# Patient Record
Sex: MALE | Race: BLACK OR AFRICAN AMERICAN | NOT HISPANIC OR LATINO | Employment: OTHER | URBAN - METROPOLITAN AREA
[De-identification: names, ages, dates, MRNs, and addresses within clinical notes are randomized per-mention and may not be internally consistent; named-entity substitution may affect disease eponyms.]

---

## 2017-01-25 ENCOUNTER — HISTORICAL (OUTPATIENT)
Dept: ADMINISTRATIVE | Facility: HOSPITAL | Age: 67
End: 2017-01-25

## 2017-05-11 LAB
COLOR STL: NORMAL
CONSISTENCY STL: NORMAL
HEMOCCULT SP1 STL QL: NEGATIVE

## 2017-05-14 LAB
COLOR STL: NORMAL
CONSISTENCY STL: NORMAL
HEMOCCULT SP2 STL QL: NEGATIVE

## 2017-05-15 LAB
COLOR STL: NORMAL
CONSISTENCY STL: NORMAL

## 2017-05-16 ENCOUNTER — HISTORICAL (OUTPATIENT)
Dept: ADMINISTRATIVE | Facility: HOSPITAL | Age: 67
End: 2017-05-16

## 2017-05-16 LAB
AMPHET UR QL SCN: NEGATIVE
BARBITURATE SCN PRESENT UR: NEGATIVE
BENZODIAZ UR QL SCN: NEGATIVE
CANNABINOIDS UR QL SCN: NEGATIVE
COCAINE UR QL SCN: NEGATIVE
ETHANOL SERPL-MCNC: <5 MG/DL
OPIATES UR QL SCN: NEGATIVE
PCP UR QL: NEGATIVE

## 2017-07-05 ENCOUNTER — HISTORICAL (OUTPATIENT)
Dept: ADMINISTRATIVE | Facility: HOSPITAL | Age: 67
End: 2017-07-05

## 2017-07-26 ENCOUNTER — HISTORICAL (OUTPATIENT)
Dept: ADMINISTRATIVE | Facility: HOSPITAL | Age: 67
End: 2017-07-26

## 2017-07-26 LAB
ABS NEUT (OLG): 2.26 X10(3)/MCL (ref 2.1–9.2)
ALBUMIN SERPL-MCNC: 3.4 GM/DL (ref 3.4–5)
ALBUMIN/GLOB SERPL: 1 RATIO (ref 1–2)
ALP SERPL-CCNC: 68 UNIT/L (ref 45–117)
ALT SERPL-CCNC: 7 UNIT/L (ref 12–78)
AMPHET UR QL SCN: NEGATIVE
AST SERPL-CCNC: 17 UNIT/L (ref 15–37)
BARBITURATE SCN PRESENT UR: NEGATIVE
BASOPHILS # BLD AUTO: 0.02 X10(3)/MCL
BASOPHILS NFR BLD AUTO: 0 % (ref 0–1)
BENZODIAZ UR QL SCN: NEGATIVE
BILIRUB SERPL-MCNC: 0.5 MG/DL (ref 0.2–1)
BILIRUBIN DIRECT+TOT PNL SERPL-MCNC: 0.1 MG/DL
BILIRUBIN DIRECT+TOT PNL SERPL-MCNC: 0.4 MG/DL
BUN SERPL-MCNC: 18 MG/DL (ref 7–18)
CALCIUM SERPL-MCNC: 9.8 MG/DL (ref 8.5–10.1)
CANNABINOIDS UR QL SCN: NEGATIVE
CHLORIDE SERPL-SCNC: 102 MMOL/L (ref 98–107)
CO2 SERPL-SCNC: 29 MMOL/L (ref 21–32)
COCAINE UR QL SCN: NEGATIVE
CREAT SERPL-MCNC: 1.4 MG/DL (ref 0.6–1.3)
EOSINOPHIL # BLD AUTO: 0.18 X10(3)/MCL
EOSINOPHIL NFR BLD AUTO: 3 % (ref 0–5)
ERYTHROCYTE [DISTWIDTH] IN BLOOD BY AUTOMATED COUNT: 13.9 % (ref 11.5–14.5)
ETHANOL SERPL-MCNC: 4 MG/DL
GLOBULIN SER-MCNC: 4.1 GM/ML (ref 2.3–3.5)
GLUCOSE SERPL-MCNC: 93 MG/DL (ref 74–106)
HCT VFR BLD AUTO: 38.5 % (ref 40–51)
HGB BLD-MCNC: 13.1 GM/DL (ref 13.5–17.5)
IMM GRANULOCYTES # BLD AUTO: 0.03 10*3/UL
IMM GRANULOCYTES NFR BLD AUTO: 0 %
LYMPHOCYTES # BLD AUTO: 3.22 X10(3)/MCL
LYMPHOCYTES NFR BLD AUTO: 50 % (ref 15–40)
MCH RBC QN AUTO: 32.3 PG (ref 26–34)
MCHC RBC AUTO-ENTMCNC: 34 GM/DL (ref 31–37)
MCV RBC AUTO: 95.1 FL (ref 80–100)
MONOCYTES # BLD AUTO: 0.68 X10(3)/MCL
MONOCYTES NFR BLD AUTO: 11 % (ref 4–12)
NEUTROPHILS # BLD AUTO: 2.26 X10(3)/MCL
NEUTROPHILS NFR BLD AUTO: 35 X10(3)/MCL
OPIATES UR QL SCN: NEGATIVE
PCP UR QL: NEGATIVE
PH UR STRIP.AUTO: 5.5 [PH] (ref 5–8)
PLATELET # BLD AUTO: 190 X10(3)/MCL (ref 130–400)
PMV BLD AUTO: 11.5 FL (ref 7.4–10.4)
POTASSIUM SERPL-SCNC: 4.6 MMOL/L (ref 3.5–5.1)
PROT SERPL-MCNC: 7.5 GM/DL (ref 6.4–8.2)
RBC # BLD AUTO: 4.05 X10(6)/MCL (ref 4.5–5.9)
SODIUM SERPL-SCNC: 138 MMOL/L (ref 136–145)
TEMPERATURE, URINE (OHS): 24 DEGC (ref 20–25)
WBC # SPEC AUTO: 6.4 X10(3)/MCL (ref 4.5–11)

## 2017-08-10 ENCOUNTER — HISTORICAL (OUTPATIENT)
Dept: ADMINISTRATIVE | Facility: HOSPITAL | Age: 67
End: 2017-08-10

## 2017-08-10 LAB
BUN SERPL-MCNC: 13 MG/DL (ref 7–18)
CALCIUM SERPL-MCNC: 9.6 MG/DL (ref 8.5–10.1)
CHLORIDE SERPL-SCNC: 104 MMOL/L (ref 98–107)
CO2 SERPL-SCNC: 25 MMOL/L (ref 21–32)
CREAT SERPL-MCNC: 1.3 MG/DL (ref 0.6–1.3)
GLUCOSE SERPL-MCNC: 91 MG/DL (ref 74–106)
POTASSIUM SERPL-SCNC: 4.1 MMOL/L (ref 3.5–5.1)
SODIUM SERPL-SCNC: 138 MMOL/L (ref 136–145)

## 2017-08-21 ENCOUNTER — HISTORICAL (OUTPATIENT)
Dept: ADMINISTRATIVE | Facility: HOSPITAL | Age: 67
End: 2017-08-21

## 2017-08-21 LAB
ABS NEUT (OLG): 2.19 X10(3)/MCL (ref 2.1–9.2)
ALBUMIN SERPL-MCNC: 3.5 GM/DL (ref 3.4–5)
ALBUMIN/GLOB SERPL: 1 RATIO (ref 1–2)
ALP SERPL-CCNC: 82 UNIT/L (ref 45–117)
ALT SERPL-CCNC: 9 UNIT/L (ref 12–78)
AST SERPL-CCNC: 19 UNIT/L (ref 15–37)
BASOPHILS # BLD AUTO: 0.03 X10(3)/MCL
BASOPHILS NFR BLD AUTO: 0 % (ref 0–1)
BILIRUB SERPL-MCNC: 0.6 MG/DL (ref 0.2–1)
BILIRUBIN DIRECT+TOT PNL SERPL-MCNC: 0.2 MG/DL
BILIRUBIN DIRECT+TOT PNL SERPL-MCNC: 0.4 MG/DL
BUN SERPL-MCNC: 12 MG/DL (ref 7–18)
CALCIUM SERPL-MCNC: 9.4 MG/DL (ref 8.5–10.1)
CHLORIDE SERPL-SCNC: 106 MMOL/L (ref 98–107)
CO2 SERPL-SCNC: 28 MMOL/L (ref 21–32)
CREAT SERPL-MCNC: 1.4 MG/DL (ref 0.6–1.3)
EOSINOPHIL # BLD AUTO: 0.19 X10(3)/MCL
EOSINOPHIL NFR BLD AUTO: 3 % (ref 0–5)
ERYTHROCYTE [DISTWIDTH] IN BLOOD BY AUTOMATED COUNT: 13.7 % (ref 11.5–14.5)
GLOBULIN SER-MCNC: 4.1 GM/ML (ref 2.3–3.5)
GLUCOSE SERPL-MCNC: 103 MG/DL (ref 74–106)
HCT VFR BLD AUTO: 39.7 % (ref 40–51)
HGB BLD-MCNC: 13.5 GM/DL (ref 13.5–17.5)
IMM GRANULOCYTES # BLD AUTO: 0.01 10*3/UL
IMM GRANULOCYTES NFR BLD AUTO: 0 %
LYMPHOCYTES # BLD AUTO: 2.49 X10(3)/MCL
LYMPHOCYTES NFR BLD AUTO: 44 % (ref 15–40)
MCH RBC QN AUTO: 32.5 PG (ref 26–34)
MCHC RBC AUTO-ENTMCNC: 34 GM/DL (ref 31–37)
MCV RBC AUTO: 95.4 FL (ref 80–100)
MONOCYTES # BLD AUTO: 0.75 X10(3)/MCL
MONOCYTES NFR BLD AUTO: 13 % (ref 4–12)
NEUTROPHILS # BLD AUTO: 2.19 X10(3)/MCL
NEUTROPHILS NFR BLD AUTO: 39 X10(3)/MCL
PLATELET # BLD AUTO: 211 X10(3)/MCL (ref 130–400)
PMV BLD AUTO: 10.7 FL (ref 7.4–10.4)
POTASSIUM SERPL-SCNC: 4.1 MMOL/L (ref 3.5–5.1)
PROT SERPL-MCNC: 7.6 GM/DL (ref 6.4–8.2)
RBC # BLD AUTO: 4.16 X10(6)/MCL (ref 4.5–5.9)
SODIUM SERPL-SCNC: 141 MMOL/L (ref 136–145)
WBC # SPEC AUTO: 5.7 X10(3)/MCL (ref 4.5–11)

## 2017-09-19 ENCOUNTER — HISTORICAL (OUTPATIENT)
Dept: ADMINISTRATIVE | Facility: HOSPITAL | Age: 67
End: 2017-09-19

## 2017-09-19 LAB
ABS NEUT (OLG): 3.58 X10(3)/MCL (ref 2.1–9.2)
ALBUMIN SERPL-MCNC: 3.2 GM/DL (ref 3.4–5)
ALBUMIN/GLOB SERPL: 1 RATIO (ref 1–2)
ALP SERPL-CCNC: 83 UNIT/L (ref 45–117)
ALT SERPL-CCNC: 7 UNIT/L (ref 12–78)
AST SERPL-CCNC: 19 UNIT/L (ref 15–37)
BASOPHILS # BLD AUTO: 0.02 X10(3)/MCL
BASOPHILS NFR BLD AUTO: 0 % (ref 0–1)
BILIRUB SERPL-MCNC: 0.3 MG/DL (ref 0.2–1)
BILIRUBIN DIRECT+TOT PNL SERPL-MCNC: 0.1 MG/DL
BILIRUBIN DIRECT+TOT PNL SERPL-MCNC: 0.2 MG/DL
BUN SERPL-MCNC: 12 MG/DL (ref 7–18)
CALCIUM SERPL-MCNC: 9 MG/DL (ref 8.5–10.1)
CHLORIDE SERPL-SCNC: 104 MMOL/L (ref 98–107)
CO2 SERPL-SCNC: 29 MMOL/L (ref 21–32)
CREAT SERPL-MCNC: 1.3 MG/DL (ref 0.6–1.3)
EOSINOPHIL # BLD AUTO: 0.18 X10(3)/MCL
EOSINOPHIL NFR BLD AUTO: 2 % (ref 0–5)
ERYTHROCYTE [DISTWIDTH] IN BLOOD BY AUTOMATED COUNT: 13.1 % (ref 11.5–14.5)
GLOBULIN SER-MCNC: 4.2 GM/ML (ref 2.3–3.5)
GLUCOSE SERPL-MCNC: 93 MG/DL (ref 74–106)
HCT VFR BLD AUTO: 37.8 % (ref 40–51)
HGB BLD-MCNC: 13 GM/DL (ref 13.5–17.5)
IMM GRANULOCYTES # BLD AUTO: 0.02 10*3/UL
IMM GRANULOCYTES NFR BLD AUTO: 0 %
LYMPHOCYTES # BLD AUTO: 2.75 X10(3)/MCL
LYMPHOCYTES NFR BLD AUTO: 38 % (ref 15–40)
MCH RBC QN AUTO: 32.5 PG (ref 26–34)
MCHC RBC AUTO-ENTMCNC: 34.4 GM/DL (ref 31–37)
MCV RBC AUTO: 94.5 FL (ref 80–100)
MONOCYTES # BLD AUTO: 0.76 X10(3)/MCL
MONOCYTES NFR BLD AUTO: 10 % (ref 4–12)
NEUTROPHILS # BLD AUTO: 3.58 X10(3)/MCL
NEUTROPHILS NFR BLD AUTO: 49 X10(3)/MCL
PLATELET # BLD AUTO: 234 X10(3)/MCL (ref 130–400)
PMV BLD AUTO: 10.3 FL (ref 7.4–10.4)
POTASSIUM SERPL-SCNC: 4 MMOL/L (ref 3.5–5.1)
PROT SERPL-MCNC: 7.4 GM/DL (ref 6.4–8.2)
RBC # BLD AUTO: 4 X10(6)/MCL (ref 4.5–5.9)
SODIUM SERPL-SCNC: 137 MMOL/L (ref 136–145)
WBC # SPEC AUTO: 7.3 X10(3)/MCL (ref 4.5–11)

## 2017-12-19 ENCOUNTER — HISTORICAL (OUTPATIENT)
Dept: INTERNAL MEDICINE | Facility: CLINIC | Age: 67
End: 2017-12-19

## 2018-02-16 ENCOUNTER — HISTORICAL (OUTPATIENT)
Dept: ADMINISTRATIVE | Facility: HOSPITAL | Age: 68
End: 2018-02-16

## 2019-01-08 ENCOUNTER — HISTORICAL (OUTPATIENT)
Dept: INTERNAL MEDICINE | Facility: CLINIC | Age: 69
End: 2019-01-08

## 2019-04-09 ENCOUNTER — HISTORICAL (OUTPATIENT)
Dept: LAB | Facility: HOSPITAL | Age: 69
End: 2019-04-09

## 2019-04-10 LAB — GRAM STN SPEC: NORMAL

## 2019-04-12 LAB — FINAL CULTURE: NORMAL

## 2019-05-14 LAB — FINAL CULTURE: NORMAL

## 2019-08-20 ENCOUNTER — HISTORICAL (OUTPATIENT)
Dept: INTERNAL MEDICINE | Facility: CLINIC | Age: 69
End: 2019-08-20

## 2019-09-18 ENCOUNTER — HISTORICAL (OUTPATIENT)
Dept: RADIOLOGY | Facility: HOSPITAL | Age: 69
End: 2019-09-18

## 2019-10-28 ENCOUNTER — HISTORICAL (OUTPATIENT)
Dept: INTERNAL MEDICINE | Facility: CLINIC | Age: 69
End: 2019-10-28

## 2020-04-29 ENCOUNTER — HISTORICAL (OUTPATIENT)
Dept: RADIOLOGY | Facility: HOSPITAL | Age: 70
End: 2020-04-29

## 2020-08-20 ENCOUNTER — HISTORICAL (OUTPATIENT)
Dept: INTERNAL MEDICINE | Facility: CLINIC | Age: 70
End: 2020-08-20

## 2021-03-09 ENCOUNTER — HISTORICAL (OUTPATIENT)
Dept: INTERNAL MEDICINE | Facility: CLINIC | Age: 71
End: 2021-03-09

## 2021-07-02 LAB — NONINV COLON CA DNA+OCC BLD SCRN STL QL: NEGATIVE

## 2021-09-28 ENCOUNTER — HISTORICAL (OUTPATIENT)
Dept: INTERNAL MEDICINE | Facility: CLINIC | Age: 71
End: 2021-09-28

## 2022-03-09 ENCOUNTER — HISTORICAL (OUTPATIENT)
Dept: INTERNAL MEDICINE | Facility: CLINIC | Age: 72
End: 2022-03-09

## 2022-03-09 LAB
ABS NEUT (OLG): 3.22 (ref 2.1–9.2)
ALBUMIN SERPL-MCNC: 3.7 G/DL (ref 3.4–4.8)
ALBUMIN/GLOB SERPL: 0.9 {RATIO} (ref 1.1–2)
ALP SERPL-CCNC: 91 U/L (ref 40–150)
ALT SERPL-CCNC: 10 U/L (ref 0–55)
APPEARANCE, UA: CLEAR
AST SERPL-CCNC: 21 U/L (ref 5–34)
BACTERIA SPEC CULT: NORMAL
BASOPHILS # BLD AUTO: 0 10*3/UL (ref 0–0.2)
BASOPHILS NFR BLD AUTO: 0 %
BILIRUB SERPL-MCNC: 0.6 MG/DL
BILIRUB UR QL STRIP: NEGATIVE
BILIRUBIN DIRECT+TOT PNL SERPL-MCNC: 0.2 (ref 0–0.5)
BILIRUBIN DIRECT+TOT PNL SERPL-MCNC: 0.4 (ref 0–0.8)
BUN SERPL-MCNC: 9.7 MG/DL (ref 8.4–25.7)
CALCIUM SERPL-MCNC: 9.8 MG/DL (ref 8.7–10.5)
CHLORIDE SERPL-SCNC: 106 MMOL/L (ref 98–107)
CHOLEST SERPL-MCNC: 214 MG/DL
CHOLEST/HDLC SERPL: 4 {RATIO} (ref 0–5)
CO2 SERPL-SCNC: 23 MMOL/L (ref 23–31)
COLOR UR: NORMAL
CREAT SERPL-MCNC: 1.14 MG/DL (ref 0.73–1.18)
EOSINOPHIL # BLD AUTO: 0.2 10*3/UL (ref 0–0.9)
EOSINOPHIL NFR BLD AUTO: 3 %
ERYTHROCYTE [DISTWIDTH] IN BLOOD BY AUTOMATED COUNT: 14.8 % (ref 11.5–14.5)
EST. AVERAGE GLUCOSE BLD GHB EST-MCNC: 99.7 MG/DL
FLAG2 (OHS): 80
FLAG3 (OHS): 80
FLAGS (OHS): 70
GLOBULIN SER-MCNC: 4 G/DL (ref 2.4–3.5)
GLUCOSE (UA): NORMAL
GLUCOSE SERPL-MCNC: 110 MG/DL (ref 82–115)
HBA1C MFR BLD: 5.1 %
HCT VFR BLD AUTO: 42.3 % (ref 40–51)
HDLC SERPL-MCNC: 53 MG/DL (ref 35–60)
HEMOLYSIS INTERF INDEX SERPL-ACNC: 6
HGB BLD-MCNC: 14 G/DL (ref 13.5–17.5)
HGB UR QL STRIP: NEGATIVE
HYALINE CASTS #/AREA URNS LPF: NORMAL /[LPF]
ICTERIC INTERF INDEX SERPL-ACNC: 1
IMM GRANULOCYTES # BLD AUTO: 0.02 10*3/UL
IMM GRANULOCYTES NFR BLD AUTO: 0 %
IMM. NE 2 SUSPECT FLAG (OHS): 10
KETONES UR QL STRIP: NEGATIVE
LDLC SERPL CALC-MCNC: 142 MG/DL (ref 50–140)
LEUKOCYTE ESTERASE UR QL STRIP: NEGATIVE
LIPEMIC INTERF INDEX SERPL-ACNC: 6
LOW EVENT # SUSPECT FLAG (OHS): 70
LYMPHOCYTES # BLD AUTO: 2.9 10*3/UL (ref 0.6–4.6)
LYMPHOCYTES NFR BLD AUTO: 42 %
MANUAL DIFF? (OHS): NO
MCH RBC QN AUTO: 32 PG (ref 26–34)
MCHC RBC AUTO-ENTMCNC: 33.1 G/DL (ref 31–37)
MCV RBC AUTO: 96.8 FL (ref 80–100)
MO BLASTS SUSPECT FLAG (OHS): 70
MONOCYTES # BLD AUTO: 0.6 10*3/UL (ref 0.1–1.3)
MONOCYTES NFR BLD AUTO: 8 %
MUCOUS THREADS URNS QL MICRO: NORMAL
NEUTROPHILS # BLD AUTO: 3.22 10*3/UL (ref 2.1–9.2)
NEUTROPHILS NFR BLD AUTO: 46 %
NITRITE UR QL STRIP: NEGATIVE
NRBC BLD AUTO-RTO: 0 % (ref 0–0.2)
PH UR STRIP: 6 [PH] (ref 4.5–8)
PLATELET # BLD AUTO: 204 10*3/UL (ref 130–400)
PLATELET CLUMPS SUSPECT FLAG (OHS): 30
PMV BLD AUTO: 11.7 FL (ref 7.4–10.4)
POTASSIUM SERPL-SCNC: 3.9 MMOL/L (ref 3.5–5.1)
PROT SERPL-MCNC: 7.7 G/DL (ref 5.8–7.6)
PROT UR QL STRIP: NEGATIVE
PSA SERPL-MCNC: 0.24 NG/ML
RBC # BLD AUTO: 4.37 10*6/UL (ref 4.5–5.9)
RBC #/AREA URNS HPF: NORMAL /[HPF] (ref 0–5)
SODIUM SERPL-SCNC: 136 MMOL/L (ref 136–145)
SP GR UR STRIP: 1.01 (ref 1–1.03)
SQUAMOUS EPITHELIAL, UA: NORMAL
TRIGL SERPL-MCNC: 95 MG/DL (ref 34–140)
TSH SERPL-ACNC: 1.82 M[IU]/L (ref 0.35–4.94)
UROBILINOGEN UR STRIP-ACNC: NORMAL
VLDLC SERPL CALC-MCNC: 19 MG/DL
WBC # SPEC AUTO: 7 10*3/UL (ref 4.5–11)
WBC #/AREA URNS HPF: NORMAL /[HPF] (ref 0–5)

## 2022-04-09 ENCOUNTER — HISTORICAL (OUTPATIENT)
Dept: ADMINISTRATIVE | Facility: HOSPITAL | Age: 72
End: 2022-04-09

## 2022-04-25 VITALS
HEIGHT: 68 IN | BODY MASS INDEX: 25.46 KG/M2 | SYSTOLIC BLOOD PRESSURE: 138 MMHG | DIASTOLIC BLOOD PRESSURE: 78 MMHG | WEIGHT: 168 LBS

## 2022-04-30 NOTE — PROGRESS NOTES
Patient:   Jose De Jesus Zaidi             MRN: [id number]            FIN: [id number]               Age:   68 years     Sex:  Male     :  1950   Associated Diagnoses:   None   Author:   Lubna MOHR, Doretha Gleason reviewed: Will discuss with patient during follow up appointment on  2019 at 11:10am.

## 2022-05-02 ENCOUNTER — TELEPHONE (OUTPATIENT)
Dept: INTERNAL MEDICINE | Facility: CLINIC | Age: 72
End: 2022-05-02

## 2022-05-02 NOTE — TELEPHONE ENCOUNTER
Pt called stating that you guys discussed a shot he was suppose to receive but he can not recall the name of the shot. Pt is also asking to speak to you, a good call back number is [phone].

## 2022-05-02 NOTE — TELEPHONE ENCOUNTER
We did not discuss shots during my encounter. Perhaps, he spoke to Eva about vaccines and that's where the conversation started. However, he didn't discuss any concerns with me. Otherwise, we will revisit this at his next visit.       Thanks,   REGIS Peña

## 2022-05-02 NOTE — TELEPHONE ENCOUNTER
Called pt to get more information. Pt states it is the Shingles vaccine you guys were talking about but looked in his chart but he has taken both doses in 2021. He last took his Pneumococcal vaccine on 10- so maybe that was the vaccine you guys discussed

## 2022-05-02 NOTE — TELEPHONE ENCOUNTER
Okay would you like for me to call the patient back and find out who he spoke to, because he did not want to give me info just kept saying he wanted you to call him.

## 2022-05-02 NOTE — HISTORICAL [facility]ER
This is a historical note converted from Kirsten. Formatting and pictures may have been removed.  Please reference Cerbarbara for original formatting and attached multimedia. Chief Complaint  HEP C F/U  History of Present Illness  66 yo BM presents for HCV f/u visit.? Reports 100% adherent to marin Black well. Drinking apprx 3-4 16 oz bottles of water per day.? No c/o today.  ?  2/9/17  67 yo BM presents for initial visit HCV, dx 12/16 in internal medicine clinic.? 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 Clean x > 1 yr now.? Occasional etoh use, but can easily quit completely.? Home tattoos at 16 yr old.? No known HCV + sexual contacts, no blood transfusions.? Denies jaundice, dark urine, or marilee stools.? No precipitous weight loss. Missed U/S appt, r/sd for 3/6/17. [1]  Review of Systems  ?  ?  Constitutional: negative except as stated in HPI  Eye: negative except as stated in HPI  ENMT: negative except as stated in HPI  Respiratory: negative except as stated in HPI  Cardiovascular: negative except as stated in HPI  Gastrointestinal: negative except as stated in HPI  Genitourinary: negative except as stated in HPI  Hema/Lymph: negative except as stated in HPI  Endocrine: negative except as stated in HPI  Immunologic: negative except as stated in HPI  Musculoskeletal: negative except as stated in HPI  Integumentary: negative except as stated in HPI  Neurologic: negative except as stated in HPI  ?   All Other ROS_ ?negative except as stated in HPI  Physical Exam  Vitals & Measurements  T:?36.6? ?C ?(Oral)? HR:?67?(Peripheral)? BP:?148/83? HT:?172?cm? HT:?172?cm? WT:?74.38?kg? WT:?74.38?kg? BMI:?25.14?  General: AAO X 4, afebrile  Eye: no icterus  Respiratory: BBS CTA, non-labored, symmetrical  Cardiovascular: S1S2, RRR  Gastrointestinal BS + 4 quadrants, NTND, soft  Musculoskeletal: MAEW, steady gait  Integumentary: WDI  Neurologic: CN II-XII intact  Assessment/Plan  1.?Hepatitis  C  ??GT 1a, treatment naive (dx 12/16), F4, NS5A RAVs detected  cont to abstain from all illicits & etoh  blood/sex precautions  cont Harvoni 1 po daily, week 6/12  drink plenty of water, repeat BMP today  Hep B vax #2 today, #3 11/2017  rtc 6 wks w rodney  Ordered:  hepatitis B adult vaccine, 1 mL, 20 mcg =, form: Injection, IM, Once-Unscheduled, first dose 08/10/17 11:52:00 CDT  Basic Metabolic Panel, Routine collect, *Est. 08/10/17 3:00:00 CDT, Blood, Order for future visit, *Est. Stop date 08/10/17 3:00:00 CDT, Lab Collect, Hepatitis C, On Exactly, 08/10/17 11:52:00 CDT  Clinic Follow up, *Est. 09/21/17 3:00:00 CDT, Order for future visit, Hepatitis C, [facility]  Office/Outpatient Visit Level 4 Established 09261 , Hepatitis C, [facility] C, 08/10/17 11:52:00 CDT  ?   Problem List/Past Medical History  HTN - Hypertension  Tobacco user  Tobacco user  Tobacco user  Historical  No historical problems  Procedure/Surgical History  LEFT FOOT SURGERY (2016), BACK SURGERY (1996), Cataract surgery.  Medications  DICLOFENAC 0.1% EYE DROPS  DORZOLAMIDE-TIMOLOL EYE DROPS  Harvoni 90 mg-400 mg oral tablet, 1 tab(s), Oral, Daily, 2 refills  hepatitis B adult vaccine, 20 mcg, 1 mL, IM, Once-Unscheduled  hydrochlorothiazide-lisinopril 12.5 mg-20 mg oral tablet, 1 tab(s), Oral, Daily, 2 refills  PREDNISOLONE AC 1% EYE DROP  Allergies  No Known Medication Allergies  Social History  Alcohol  Past, Beer, Liquor, 1-2 times per month, Alcohol use interferes with work or home: No. Drinks more than intended: No. Others hurt by drinking: No. Ready to change: No.  Employment/School  Retired, disabled  Home/Environment  Lives with Children. Living situation: Home/Independent. Alcohol abuse in household: No. Substance abuse in household: No. Smoker in household: No. Feels unsafe at home: No. Safe place to go: Yes. Family/Friends available for support: Yes. Concern for family members at home: No. Major illness in household:  No.  Nutrition/Health  Regular, Caffeine intake amount: COFFEE SODA TEA. Wants to lose weight: No. Sleeping concerns: No. Feels highly stressed: No.  Substance Abuse  Never  Tobacco  Current some day smoker, Cigarettes, Previous treatment: None.  Current every day smoker, Cigarettes, 10 per day. Started age 15.0 Years. Ready to change: Yes.  Family History  Asthma.: Mother.  : Mother, Father and Brother.  Heart disease: Father.  Lab Results  Creatinine 1.40 mg/dL 2017 11:36 CDT (High)  eGFR-AA 65 mL/min 2017 11:36 CDT (Low)  AST 17 unit/L 2017 11:36 CDT  ALT 7 unit/L 2017 11:36 CDT (Low)  Platelet 190 x10(3)/mcL 2017 11:36 CDT  Ethanol Lvl 4.0 mg/dL 2017 11:36 CDT  U Amph Scr NEG 2017 11:35 CDT  U Keyonna Scr NEG 2017 11:35 CDT  U Benzodia Scr NEG 2017 11:35 CDT  U Cannab Scr NEG 2017 11:35 CDT  U Cocaine Scr NEG 2017 11:35 CDT  U Opiate Scr NEG 2017 11:35 CDT  U Phencyclidine Scr NEG 2017 11:35 CDT  HCV Not Detected?  Result type:???????  Hep C Qnt-LC  Result date:???????  2017 11:36 CDT  [3]  Diagnostic Results  ?  Reason For Exam  Unspecified viral hepatitis C without hepatic coma;Hepatitis  ?   Radiology Report  ?  EXAM: Ultrasound abdomen, limited  ?  INDICATION: Hepatitis C  ?  TECHNIQUE: Transverse and longitudinal ultrasonographic imaging of the  right upper quadrant of the abdomen is obtained. Select images are  submitted for review.  ?  COMPARISON: None?  ?  FINDINGS: The visualized segments of the inferior vena cava and  abdominal aorta are within normal limits. The pancreas is normal in  echogenicity as seen, with the tail partially obscured by overlying  bowel gas. The liver is normal in size and contour without focal  lesion, however is mildly and diffusely heterogeneous. Normal  hepatopedal flow is maintained. The gallbladder is nondistended  without wall thickening. The common bile duct is within normal  limits,  measuring 5.4 mm in diameter.?  ?  The right kidney measures 10.7 x 5.6 x 4.0 cm. The right kidney is  normal in echogenicity without hydronephrosis or suggestion of renal  calculus.  ?  ?  IMPRESSION:?  1. Mild diffuse heterogeneity of the liver without focal lesion.  2. Otherwise normal ultrasound evaluation of the right upper quadrant  of the abdomen.  ?  ?   Signature Line  Electronically Signed By: Belén Garcia DO  Date/Time Signed: 07/05/2017 10:46  [4]     [1]?Office Visit Note; Loree Collier 05/18/2017 11:13 CDT  [2]?Office Visit Note; Loree Collier 05/18/2017 11:13 CDT  [3]? ; Contributor_system, [facility] 07/26/2017 11:36 CDT  [4]?US Abdomen Limited; Dwayne Urbano 07/05/2017 08:59 CDT

## 2022-06-13 ENCOUNTER — PATIENT OUTREACH (OUTPATIENT)
Dept: ADMINISTRATIVE | Facility: HOSPITAL | Age: 72
End: 2022-06-13
Payer: COMMERCIAL

## 2022-06-13 NOTE — PROGRESS NOTES
Population Health Outreach.Records Received, hyper-linked into chart at this time. The following record(s)  below were uploaded for Health Maintenance .             10/7/2021 MARCO

## 2022-10-27 ENCOUNTER — OFFICE VISIT (OUTPATIENT)
Dept: INTERNAL MEDICINE | Facility: CLINIC | Age: 72
End: 2022-10-27
Payer: COMMERCIAL

## 2022-10-27 VITALS
BODY MASS INDEX: 26.77 KG/M2 | WEIGHT: 176.63 LBS | DIASTOLIC BLOOD PRESSURE: 74 MMHG | TEMPERATURE: 98 F | HEART RATE: 66 BPM | HEIGHT: 68 IN | SYSTOLIC BLOOD PRESSURE: 134 MMHG | RESPIRATION RATE: 20 BRPM

## 2022-10-27 DIAGNOSIS — Z23 NEED FOR VACCINATION: Primary | ICD-10-CM

## 2022-10-27 DIAGNOSIS — Z72.0 TOBACCO ABUSE: ICD-10-CM

## 2022-10-27 DIAGNOSIS — N40.0 BENIGN PROSTATIC HYPERPLASIA, UNSPECIFIED WHETHER LOWER URINARY TRACT SYMPTOMS PRESENT: ICD-10-CM

## 2022-10-27 DIAGNOSIS — Z86.19 HISTORY OF HEPATITIS C: ICD-10-CM

## 2022-10-27 DIAGNOSIS — Z00.00 WELLNESS EXAMINATION: ICD-10-CM

## 2022-10-27 DIAGNOSIS — N18.2 CKD (CHRONIC KIDNEY DISEASE) STAGE 2, GFR 60-89 ML/MIN: ICD-10-CM

## 2022-10-27 DIAGNOSIS — Z12.11 SCREENING FOR COLON CANCER: ICD-10-CM

## 2022-10-27 DIAGNOSIS — I10 HYPERTENSION, UNSPECIFIED TYPE: ICD-10-CM

## 2022-10-27 DIAGNOSIS — R39.9 LOWER URINARY TRACT SYMPTOMS (LUTS): ICD-10-CM

## 2022-10-27 PROCEDURE — 3288F FALL RISK ASSESSMENT DOCD: CPT | Mod: CPTII,,, | Performed by: NURSE PRACTITIONER

## 2022-10-27 PROCEDURE — 90677 PCV20 VACCINE IM: CPT | Mod: PBBFAC

## 2022-10-27 PROCEDURE — 4010F ACE/ARB THERAPY RXD/TAKEN: CPT | Mod: CPTII,,, | Performed by: NURSE PRACTITIONER

## 2022-10-27 PROCEDURE — 3288F PR FALLS RISK ASSESSMENT DOCUMENTED: ICD-10-PCS | Mod: CPTII,,, | Performed by: NURSE PRACTITIONER

## 2022-10-27 PROCEDURE — 1160F PR REVIEW ALL MEDS BY PRESCRIBER/CLIN PHARMACIST DOCUMENTED: ICD-10-PCS | Mod: CPTII,,, | Performed by: NURSE PRACTITIONER

## 2022-10-27 PROCEDURE — 1101F PT FALLS ASSESS-DOCD LE1/YR: CPT | Mod: CPTII,,, | Performed by: NURSE PRACTITIONER

## 2022-10-27 PROCEDURE — 99214 OFFICE O/P EST MOD 30 MIN: CPT | Mod: S$PBB,,, | Performed by: NURSE PRACTITIONER

## 2022-10-27 PROCEDURE — 1126F PR PAIN SEVERITY QUANTIFIED, NO PAIN PRESENT: ICD-10-PCS | Mod: CPTII,,, | Performed by: NURSE PRACTITIONER

## 2022-10-27 PROCEDURE — 1160F RVW MEDS BY RX/DR IN RCRD: CPT | Mod: CPTII,,, | Performed by: NURSE PRACTITIONER

## 2022-10-27 PROCEDURE — 1159F PR MEDICATION LIST DOCUMENTED IN MEDICAL RECORD: ICD-10-PCS | Mod: CPTII,,, | Performed by: NURSE PRACTITIONER

## 2022-10-27 PROCEDURE — 3075F SYST BP GE 130 - 139MM HG: CPT | Mod: CPTII,,, | Performed by: NURSE PRACTITIONER

## 2022-10-27 PROCEDURE — 4010F PR ACE/ARB THEARPY RXD/TAKEN: ICD-10-PCS | Mod: CPTII,,, | Performed by: NURSE PRACTITIONER

## 2022-10-27 PROCEDURE — 99214 PR OFFICE/OUTPT VISIT, EST, LEVL IV, 30-39 MIN: ICD-10-PCS | Mod: S$PBB,,, | Performed by: NURSE PRACTITIONER

## 2022-10-27 PROCEDURE — 1159F MED LIST DOCD IN RCRD: CPT | Mod: CPTII,,, | Performed by: NURSE PRACTITIONER

## 2022-10-27 PROCEDURE — 1126F AMNT PAIN NOTED NONE PRSNT: CPT | Mod: CPTII,,, | Performed by: NURSE PRACTITIONER

## 2022-10-27 PROCEDURE — 3078F DIAST BP <80 MM HG: CPT | Mod: CPTII,,, | Performed by: NURSE PRACTITIONER

## 2022-10-27 PROCEDURE — 3075F PR MOST RECENT SYSTOLIC BLOOD PRESS GE 130-139MM HG: ICD-10-PCS | Mod: CPTII,,, | Performed by: NURSE PRACTITIONER

## 2022-10-27 PROCEDURE — 3078F PR MOST RECENT DIASTOLIC BLOOD PRESSURE < 80 MM HG: ICD-10-PCS | Mod: CPTII,,, | Performed by: NURSE PRACTITIONER

## 2022-10-27 PROCEDURE — 99214 OFFICE O/P EST MOD 30 MIN: CPT | Mod: PBBFAC,25 | Performed by: NURSE PRACTITIONER

## 2022-10-27 PROCEDURE — G0008 ADMIN INFLUENZA VIRUS VAC: HCPCS | Mod: PBBFAC

## 2022-10-27 PROCEDURE — 1101F PR PT FALLS ASSESS DOC 0-1 FALLS W/OUT INJ PAST YR: ICD-10-PCS | Mod: CPTII,,, | Performed by: NURSE PRACTITIONER

## 2022-10-27 RX ORDER — TAMSULOSIN HYDROCHLORIDE 0.4 MG/1
1 CAPSULE ORAL DAILY
COMMUNITY
Start: 2022-08-01 | End: 2022-10-27 | Stop reason: SDUPTHER

## 2022-10-27 RX ORDER — DICLOFENAC POTASSIUM 50 MG/1
50 TABLET, FILM COATED ORAL 2 TIMES DAILY PRN
COMMUNITY
Start: 2022-06-15 | End: 2022-11-20

## 2022-10-27 RX ORDER — LISINOPRIL AND HYDROCHLOROTHIAZIDE 12.5; 2 MG/1; MG/1
1 TABLET ORAL DAILY
COMMUNITY
Start: 2022-09-19 | End: 2022-10-27 | Stop reason: SDUPTHER

## 2022-10-27 RX ORDER — TAMSULOSIN HYDROCHLORIDE 0.4 MG/1
0.4 CAPSULE ORAL DAILY
Qty: 90 CAPSULE | Refills: 1 | Status: SHIPPED | OUTPATIENT
Start: 2022-10-27 | End: 2023-04-25

## 2022-10-27 RX ORDER — LISINOPRIL AND HYDROCHLOROTHIAZIDE 12.5; 2 MG/1; MG/1
1 TABLET ORAL DAILY
Qty: 90 TABLET | Refills: 1 | Status: SHIPPED | OUTPATIENT
Start: 2022-10-27 | End: 2023-01-19

## 2022-10-27 RX ORDER — CHLORHEXIDINE GLUCONATE ORAL RINSE 1.2 MG/ML
15 SOLUTION DENTAL 2 TIMES DAILY
COMMUNITY
Start: 2022-06-13

## 2022-10-27 NOTE — PROGRESS NOTES
REGIS Peña   [facility]  [address] [address]      PATIENT NAME: Jose De Jesus Zaidi  : 1950  DATE: 10/27/22  MRN: [id number]      Billing Provider: REGIS Peña  Level of Service: [place] OFFICE/OUTPT VISIT, EST, LEVL IV, 30-39 MIN  Patient PCP Information       Provider PCP Type    REGIS Peña General            Reason for Visit / Chief Complaint: Follow-up and Immunizations       History of Present Illness / Problem Focused Workflow     Jose De Jesus Zaidi presents to the clinic with Follow-up and Immunizations     Initial OV (19): 69 y.o. male presenting to [facility] to establish primary care.     Previous PCP: Dr. CRISTELA Calvo. Last OV 19   PmHx: HTN, CKD Stage 2, Left Cataract, and Tobacco use   Bp at goal today. Taking HCTZ-Lisinopril 12.5mg-20mg po daily. Renal indices stable. Smoking ~ 1/2 PPD. Quit for 20 years at one point, but started smoking again 8 years ago following a back injury. He is not ready to quit but is amenable to lung cancer screening. He denies fever, chills, night sweats, chest pain, SOB, cough, or wheezing.   SHx: Left Ft Sx, Back Sx, Left Cataract Sx   FHx: Asthma, Heart Disease   Complaints today: No acute concerns    (20): 69 y.o. AA male with PmHx: HTN, CKD Stage 2, Left Cataract, and Tobacco use, presenting for f/u. Renal indices stable. Hep C ab was reactive. Hx of chronic hep C. Completed 12 wks of Harvoni on 17. Will repeat viral load. Also, pt was supposed to be undergoing an abd US q6 mths. Last abd US 18-negative study. No acute complaints today.    Health Maintenance:   AAA (19)- negative   FIT (10/27/19)- negative   PSA (2020)- 0.3   Colonoscopy- previously referred to GI lab 2019     S/p corneal transplant surgery 2020.    Telephone Visit (20): 69 y.o. AA male with PmHx of HTN, Chronic Hep C (s/p Harvoni), CKD Stage 2, Left Cataract, and  "Tobacco use, presenting for f/u via telephone. Patient has consented to telephone visit and was able to verify specific patient identification. I have verified that only myself and pt are on the telephone call and call is taking place in the state [facility]. Pt continues with prescription medications. CMP/renal indices and CBC stable. HCV RNA negative. Abd US unremarkable. He declined lung cancer screening. He mentions that his left foot has been bothering him, but states, "I need to call and schedule a appointment with the foot doctor. I just wanted to let you know." He declines any work-up at this time. States if he has issues seeing the podiatrist, he will contact the clinic for an evaluation. He has no other concerns.    (3/11/21): 70 y.o. AA male with PmHx of HTN, Chronic Hep C (s/p Harvoni), CKD Stage 2, Left Cataract, and Tobacco use, presenting for f/u. Bp at goal on repeat assessment. States taking HCTZ-Lisinopril 12.5mg-20mg po daily as prescribed. Tolerating well. Labs reviewed and unremarkable/stable. FIT is due. Last FIT 10/2019, negative. He denies any bowel-related complaints, black or tarry stools. Denies issues with urination. Ambulates independently and is ADL-independent. PSA 0.23, WNL. Pt requesting medication refills. Due for 2nd COVID vaccine at the end of this month. Tolerated first vaccination relatively well aside from mild arm pain. He has no acute concerns.    Telephone Visit (9/13/21): 71 y.o. AA male with PmHx of HTN, Chronic Hep C (s/p Harvoni), CKD Stage 2, Left Cataract, and Tobacco use, presenting for f/u. Labs not completed. States taking HCTZ-Lisinopril 12.5mg-20mg po daily as prescribed. Tolerating well. Never completed FIT. Last done 2019, negative. He denies any bowel-related complaints, black or tarry stools. Denies issues with urination. Ambulates independently and is ADL-independent. No falls since last visit. He went to ED in July with c/o painful foot calluses. Had been " "treated by podiatry in the past. He was referred to Dr. Silva for a new eval. States plans to make an appt next week. No other concerns.    (4/28/22): 72 y.o. AA male with PmHx of HTN, Chronic Hep C (s/p Harvoni), CKD Stage 2, Left Cataract, and Tobacco use, presenting for f/u. Bp at goal. States taking HCTZ-Lisinopril 12.5mg-20mg po daily as prescribed. Tolerating well. Labs reviewed and unremarkable/stable aside from slight elevations in total cholesterol and LDL. PSA 0.24. He does c/o awakening ~2-3x to urinate at night. Also feels bladder doesn't empty completely on occasion. Pt takes HCTZ. He also drinks mx bottles of water a day, up until bedtime. No other concerns.    Health Maintenance:   Colon Ca Screening-Cologuard 10/7/2021, negative   Prostate Ca Screening-PSA 0.24    Today's Visit (10/27/22): 72 y.o. AA male with PmHx of HTN, Chronic Hep C (s/p Harvoni), CKD Stage 2, Left Cataract, and Tobacco use, presenting for f/u. Bp at goal. States taking HCTZ-Lisinopril 12.5mg-20mg po daily as prescribed. Tolerating well. He forgot to complete labs, states "didn't know." Started on Flomax last visit for LUTS. Sx improved. Reports doing better. Amenable to receiving influenza and pneumo vax. No falls, cognitive changes, B/B incontinence of any other concerns today.      Review of Systems     Review of Systems   Constitutional: Negative.    HENT: Negative.     Eyes: Negative.    Respiratory: Negative.     Cardiovascular: Negative.    Gastrointestinal: Negative.    Endocrine: Negative.    Genitourinary: Negative.    Musculoskeletal: Negative.    Skin: Negative.    Allergic/Immunologic: Negative.    Neurological: Negative.    Hematological: Negative.    Psychiatric/Behavioral: Negative.       Medical / Social / Family History   History reviewed. No pertinent past medical history.    Past Surgical History:   Procedure Laterality Date    BACK SURGERY  1996    CATARACT EXTRACTION      LEFT FOOT SURGERY  2016    LEFT " LENS SURGERY         Social History    reports that he has been smoking cigarettes. He has never used smokeless tobacco. He reports that he does not currently use alcohol. He reports that he does not currently use drugs.    Family History  's family history includes Asthma in his mother; Heart attack in his father.    Medications and Allergies     Medications  Medication List with Changes/Refills   Current Medications    CHLORHEXIDINE (PERIDEX) 0.12 % SOLUTION    Use as directed 15 mLs in the mouth or throat 2 (two) times a day. Swish and spit    DICLOFENAC (CATAFLAM) 50 MG TABLET    Take 50 mg by mouth 2 (two) times daily as needed for Pain.   Changed and/or Refilled Medications    Modified Medication Previous Medication    LISINOPRIL-HYDROCHLOROTHIAZIDE (PRINZIDE,ZESTORETIC) 20-12.5 MG PER TABLET lisinopriL-hydrochlorothiazide (PRINZIDE,ZESTORETIC) 20-12.5 mg per tablet       Take 1 tablet by mouth once daily.    Take 1 tablet by mouth once daily.    TAMSULOSIN (FLOMAX) 0.4 MG CAP tamsulosin (FLOMAX) 0.4 mg Cap       Take 1 capsule (0.4 mg total) by mouth once daily.    Take 1 capsule by mouth once daily.           Allergies  Review of patient's allergies indicates:  No Known Allergies    Physical Examination     Vitals:    10/27/22 1333   BP: 134/74   Pulse: 66   Resp: 20   Temp: 98.1 °F (36.7 °C)     Physical Exam  Constitutional:       Appearance: Normal appearance.   HENT:      Head: Normocephalic and atraumatic.      Mouth/Throat:      Pharynx: Oropharynx is clear.   Eyes:      Extraocular Movements: Extraocular movements intact.      Conjunctiva/sclera: Conjunctivae normal.      Pupils: Pupils are equal, round, and reactive to light.   Cardiovascular:      Rate and Rhythm: Normal rate and regular rhythm.      Pulses: Normal pulses.      Heart sounds: Normal heart sounds.   Pulmonary:      Effort: Pulmonary effort is normal.      Breath sounds: Normal breath sounds.   Musculoskeletal:         General:  Normal range of motion.      Cervical back: Normal range of motion.      Right lower leg: No edema.      Left lower leg: No edema.   Skin:     General: Skin is warm and dry.   Neurological:      General: No focal deficit present.      Mental Status: He is alert and oriented to person, place, and time.   Psychiatric:         Mood and Affect: Mood normal.         Behavior: Behavior normal.         Thought Content: Thought content normal.         Judgment: Judgment normal.         Results     Lab Results   Component Value Date    WBC 7.0 03/09/2022    RBC 4.37 03/09/2022    HGB 14.0 03/09/2022    HCT 42.3 03/09/2022    MCV 96.8 03/09/2022    MCH 32.0 03/09/2022    MCHC 33.1 03/09/2022    RDW 14.8 03/09/2022     03/09/2022    MPV 11.7 03/09/2022     CMP  Sodium Level   Date Value Ref Range Status   03/09/2022 136 136 - 145      Potassium Level   Date Value Ref Range Status   03/09/2022 3.9 3.5 - 5.1      Carbon Dioxide   Date Value Ref Range Status   03/09/2022 23 23 - 31      Blood Urea Nitrogen   Date Value Ref Range Status   03/09/2022 9.7 8.4 - 25.7      Creatinine   Date Value Ref Range Status   03/09/2022 1.14 0.73 - 1.18      Calcium Level Total   Date Value Ref Range Status   03/09/2022 9.8 8.7 - 10.5      Albumin Level   Date Value Ref Range Status   03/09/2022 3.7 3.4 - 4.8      Bilirubin Total   Date Value Ref Range Status   03/09/2022 0.6 <=1.5      Alkaline Phosphatase   Date Value Ref Range Status   03/09/2022 91 40 - 150      Aspartate Aminotransferase   Date Value Ref Range Status   03/09/2022 21 5 - 34      Alanine Aminotransferase   Date Value Ref Range Status   03/09/2022 10 0 - 55      Estimated GFR-Non    Date Value Ref Range Status   03/09/2022 68 >=90      Lab Results   Component Value Date    CHOL 214 03/09/2022     Lab Results   Component Value Date    HDL 53 03/09/2022     No results found for: LDLCALC  Lab Results   Component Value Date    TRIG 95 03/09/2022     No  results found for: CHOLHDL  Lab Results   Component Value Date    TSH 1.8183 03/09/2022     Lab Results   Component Value Date    PHUR 6.0 03/09/2022    PROTEINUA Negative 03/09/2022    GLUCUA Normal 03/09/2022    KETONESU Negative 03/09/2022    OCCULTUA Negative 03/09/2022    NITRITE Negative 03/09/2022    LEUKOCYTESUR Negative 03/09/2022           Assessment and Plan (including Health Maintenance)     Plan:         Health Maintenance Due   Topic Date Due    Hepatitis C Screening  Never done    COVID-19 Vaccine (5 - Booster for Pfizer series) 05/27/2022       Problem List Items Addressed This Visit          Cardiac/Vascular    Hypertension    Current Assessment & Plan     At goal  Continue Lisinopril-HCTZ 20-12.5mg po daily  DASH         Relevant Medications    lisinopriL-hydrochlorothiazide (PRINZIDE,ZESTORETIC) 20-12.5 mg per tablet    Other Relevant Orders    Comprehensive Metabolic Panel    CBC Auto Differential    Lipid Panel       Renal/    CKD (chronic kidney disease) stage 2, GFR 60-89 ml/min    Lower urinary tract symptoms (LUTS)    Current Assessment & Plan     Improved with Flomax            GI    History of hepatitis C    Overview     s/p Harvoni  Abd US 4/29/20: No significant abnormality seen           Current Assessment & Plan     Check VL         Relevant Orders    Hepatitis C Virus Quantitative       Other    Tobacco abuse    Current Assessment & Plan     Cessation         Wellness examination    Overview     Prostate cancer screening:    Latest Reference Range & Units 03/09/22 08:54   PSA, Screen <=4.00  0.24   Colon cancer screening: FIT negative 10/27/2019; Cologuard 10/7/2021, negative  AAA screening (9/18/19)- negative              Other Visit Diagnoses       Need for vaccination    -  Primary    Relevant Orders    Influenza - Quadrivalent (Adjuvanted) (Completed)    Pneumococcal Conjugate Vaccine (20 Valent) (IM) (Completed)    Benign prostatic hyperplasia, unspecified whether lower urinary  tract symptoms present        Relevant Medications    tamsulosin (FLOMAX) 0.4 mg Cap    Screening for colon cancer        Relevant Orders    Cologuard Screening (Multitarget Stool DNA)            Health Maintenance Topics with due status: Not Due       Topic Last Completion Date    TETANUS VACCINE 07/09/2018    Colorectal Cancer Screening 06/28/2021    Lipid Panel 03/09/2022       Future Appointments   Date Time Provider Department Center   4/27/2023  1:30 PM REGIS Peña [facility]        NE spent a total of 30 minutes on the day of the visit.  This includes face to face time and non-face to face time preparing to see the patient (eg, review of tests), obtaining and/or reviewing separately obtained history, documenting clinical information in the electronic or other health record, independently interpreting results and communicating results to the patient/family/caregiver, or care coordinator.      Signature:  REGIS Peña  [facility]  [address] [facility] [address]    Date of encounter: 10/27/22

## 2022-10-28 ENCOUNTER — TELEPHONE (OUTPATIENT)
Dept: ADMINISTRATIVE | Facility: HOSPITAL | Age: 72
End: 2022-10-28
Payer: COMMERCIAL

## 2022-10-28 NOTE — TELEPHONE ENCOUNTER
Please call [facility] and ask them to cancel order for Cologuard as patient is UTD--last test 10/2021

## 2022-11-14 ENCOUNTER — TELEPHONE (OUTPATIENT)
Dept: INTERNAL MEDICINE | Facility: CLINIC | Age: 72
End: 2022-11-14
Payer: COMMERCIAL

## 2022-11-14 DIAGNOSIS — E78.2 MIXED HYPERLIPIDEMIA: Primary | ICD-10-CM

## 2022-11-14 DIAGNOSIS — I10 HYPERTENSION, UNSPECIFIED TYPE: ICD-10-CM

## 2022-11-14 NOTE — TELEPHONE ENCOUNTER
Pt called asking for his orders for his cologuard, I did not see anything and pt states that he did not receive anything.

## 2022-11-15 ENCOUNTER — LAB VISIT (OUTPATIENT)
Dept: LAB | Facility: HOSPITAL | Age: 72
End: 2022-11-15
Attending: NURSE PRACTITIONER
Payer: COMMERCIAL

## 2022-11-15 DIAGNOSIS — Z86.19 HISTORY OF HEPATITIS C: ICD-10-CM

## 2022-11-15 DIAGNOSIS — I10 HYPERTENSION, UNSPECIFIED TYPE: ICD-10-CM

## 2022-11-15 LAB
ALBUMIN SERPL-MCNC: 4 GM/DL (ref 3.4–4.8)
ALBUMIN/GLOB SERPL: 1.1 RATIO (ref 1.1–2)
ALP SERPL-CCNC: 72 UNIT/L (ref 40–150)
ALT SERPL-CCNC: 7 UNIT/L (ref 0–55)
AST SERPL-CCNC: 18 UNIT/L (ref 5–34)
BASOPHILS # BLD AUTO: 0.05 X10(3)/MCL (ref 0–0.2)
BASOPHILS NFR BLD AUTO: 0.8 %
BILIRUBIN DIRECT+TOT PNL SERPL-MCNC: 0.5 MG/DL
BUN SERPL-MCNC: 18.3 MG/DL (ref 8.4–25.7)
CALCIUM SERPL-MCNC: 9.8 MG/DL (ref 8.8–10)
CHLORIDE SERPL-SCNC: 103 MMOL/L (ref 98–107)
CHOLEST SERPL-MCNC: 243 MG/DL
CHOLEST/HDLC SERPL: 5 {RATIO} (ref 0–5)
CO2 SERPL-SCNC: 28 MMOL/L (ref 23–31)
CREAT SERPL-MCNC: 1.44 MG/DL (ref 0.73–1.18)
EOSINOPHIL # BLD AUTO: 0.24 X10(3)/MCL (ref 0–0.9)
EOSINOPHIL NFR BLD AUTO: 3.7 %
ERYTHROCYTE [DISTWIDTH] IN BLOOD BY AUTOMATED COUNT: 14.4 % (ref 11.5–17)
GFR SERPLBLD CREATININE-BSD FMLA CKD-EPI: 52 MLS/MIN/1.73/M2
GLOBULIN SER-MCNC: 3.6 GM/DL (ref 2.4–3.5)
GLUCOSE SERPL-MCNC: 93 MG/DL (ref 82–115)
HCT VFR BLD AUTO: 42.8 % (ref 42–52)
HDLC SERPL-MCNC: 51 MG/DL (ref 35–60)
HGB BLD-MCNC: 14 GM/DL (ref 14–18)
IMM GRANULOCYTES # BLD AUTO: 0.03 X10(3)/MCL (ref 0–0.04)
IMM GRANULOCYTES NFR BLD AUTO: 0.5 %
LDLC SERPL CALC-MCNC: 173 MG/DL (ref 50–140)
LYMPHOCYTES # BLD AUTO: 2.95 X10(3)/MCL (ref 0.6–4.6)
LYMPHOCYTES NFR BLD AUTO: 45.9 %
MCH RBC QN AUTO: 32 PG (ref 27–31)
MCHC RBC AUTO-ENTMCNC: 32.7 MG/DL (ref 33–36)
MCV RBC AUTO: 97.9 FL (ref 80–94)
MONOCYTES # BLD AUTO: 0.66 X10(3)/MCL (ref 0.1–1.3)
MONOCYTES NFR BLD AUTO: 10.3 %
NEUTROPHILS # BLD AUTO: 2.5 X10(3)/MCL (ref 2.1–9.2)
NEUTROPHILS NFR BLD AUTO: 38.8 %
NRBC BLD AUTO-RTO: 0 %
PLATELET # BLD AUTO: 196 X10(3)/MCL (ref 130–400)
PMV BLD AUTO: 11.7 FL (ref 7.4–10.4)
POTASSIUM SERPL-SCNC: 4.4 MMOL/L (ref 3.5–5.1)
PROT SERPL-MCNC: 7.6 GM/DL (ref 5.8–7.6)
RBC # BLD AUTO: 4.37 X10(6)/MCL (ref 4.7–6.1)
SODIUM SERPL-SCNC: 138 MMOL/L (ref 136–145)
TRIGL SERPL-MCNC: 93 MG/DL (ref 34–140)
VLDLC SERPL CALC-MCNC: 19 MG/DL
WBC # SPEC AUTO: 6.4 X10(3)/MCL (ref 4.5–11.5)

## 2022-11-15 PROCEDURE — 85025 COMPLETE CBC W/AUTO DIFF WBC: CPT

## 2022-11-15 PROCEDURE — 36415 COLL VENOUS BLD VENIPUNCTURE: CPT

## 2022-11-15 PROCEDURE — 87522 HEPATITIS C REVRS TRNSCRPJ: CPT

## 2022-11-15 PROCEDURE — 80053 COMPREHEN METABOLIC PANEL: CPT

## 2022-11-15 PROCEDURE — 80061 LIPID PANEL: CPT

## 2022-11-15 NOTE — TELEPHONE ENCOUNTER
Please inform patient of the following:    Kidney function decreased slightly from last assessment. Enc inc water intake. Avoid excessive intake of NSAIDs such as Advil, Aleve, IBU, motrin, mobic, diclofenac, etc.    Cholesterol panel is higher than previous. High cholesterol can lead to vascular problems and blockages. I would recommend starting a statin such as Lipitor. If amenable, I will send to pharmacy. Inform patient about side effects, and ask to report any significant muscle aches.    Lastly, see message about Cologuard. Last test 6/28/21 and negative. He repeats in 3 years.

## 2022-11-16 LAB — HCV RNA SERPL NAA+PROBE-ACNC: NORMAL IU/ML

## 2022-11-16 RX ORDER — ATORVASTATIN CALCIUM 20 MG/1
20 TABLET, FILM COATED ORAL DAILY
Qty: 90 TABLET | Refills: 1 | Status: SHIPPED | OUTPATIENT
Start: 2022-11-16 | End: 2023-11-16

## 2022-11-16 NOTE — TELEPHONE ENCOUNTER
Pt called back. Patient verbalized understanding. Pt did not verbalize any questions or concerns. Pt did state he does want to start on Lipitor.

## 2022-11-17 ENCOUNTER — PATIENT MESSAGE (OUTPATIENT)
Dept: INTERNAL MEDICINE | Facility: CLINIC | Age: 72
End: 2022-11-17
Payer: COMMERCIAL

## 2022-11-20 ENCOUNTER — HOSPITAL ENCOUNTER (EMERGENCY)
Facility: HOSPITAL | Age: 72
Discharge: HOME | End: 2022-11-20
Attending: INTERNAL MEDICINE
Payer: COMMERCIAL

## 2022-11-20 VITALS
SYSTOLIC BLOOD PRESSURE: 169 MMHG | DIASTOLIC BLOOD PRESSURE: 80 MMHG | RESPIRATION RATE: 16 BRPM | BODY MASS INDEX: 26.67 KG/M2 | HEIGHT: 68 IN | HEART RATE: 92 BPM | OXYGEN SATURATION: 99 % | WEIGHT: 176 LBS | TEMPERATURE: 98 F

## 2022-11-20 DIAGNOSIS — M54.31 RIGHT SIDED SCIATICA: Primary | ICD-10-CM

## 2022-11-20 PROCEDURE — 99284 EMERGENCY DEPT VISIT MOD MDM: CPT

## 2022-11-20 PROCEDURE — 63600175 PHARM REV CODE 636 W HCPCS: Performed by: PHYSICIAN ASSISTANT

## 2022-11-20 PROCEDURE — 96372 THER/PROPH/DIAG INJ SC/IM: CPT | Performed by: PHYSICIAN ASSISTANT

## 2022-11-20 RX ORDER — KETOROLAC TROMETHAMINE 30 MG/ML
30 INJECTION, SOLUTION INTRAMUSCULAR; INTRAVENOUS
Status: COMPLETED | OUTPATIENT
Start: 2022-11-20 | End: 2022-11-20

## 2022-11-20 RX ORDER — BACLOFEN 20 MG/1
20 TABLET ORAL 2 TIMES DAILY PRN
Qty: 15 TABLET | Refills: 0 | Status: SHIPPED | OUTPATIENT
Start: 2022-11-20 | End: 2022-12-05 | Stop reason: ALTCHOICE

## 2022-11-20 RX ORDER — GABAPENTIN 300 MG/1
300 CAPSULE ORAL 4 TIMES DAILY
COMMUNITY
Start: 2022-11-14 | End: 2022-12-05 | Stop reason: SDUPTHER

## 2022-11-20 RX ORDER — PREDNISONE 20 MG/1
20 TABLET ORAL 2 TIMES DAILY
Qty: 6 TABLET | Refills: 0 | Status: SHIPPED | OUTPATIENT
Start: 2022-11-20 | End: 2022-11-23

## 2022-11-20 RX ORDER — METHYLPREDNISOLONE SOD SUCC 125 MG
125 VIAL (EA) INJECTION
Status: COMPLETED | OUTPATIENT
Start: 2022-11-20 | End: 2022-11-20

## 2022-11-20 RX ADMIN — METHYLPREDNISOLONE SODIUM SUCCINATE 125 MG: 125 INJECTION, POWDER, FOR SOLUTION INTRAMUSCULAR; INTRAVENOUS at 05:11

## 2022-11-20 RX ADMIN — KETOROLAC TROMETHAMINE 30 MG: 30 INJECTION, SOLUTION INTRAMUSCULAR at 05:11

## 2022-11-20 NOTE — ED PROVIDER NOTES
"Encounter Date: 11/20/2022       History     Chief Complaint   Patient presents with    Leg Pain     Right anterior leg pain radiating to right foot  x 1 week. Describes pain as "burning"  Given gabapentin and naproxen by PCP but not helping.      71 yo M w/ PMHx significant for HTN, CKD II, smoking & distant surgery of lumbar spine presents to ED c/o 1 week hx of pain in RLE which begins in R buttocks & lateral thigh & radiates down to anterior lower leg. Reports pain is burning in nature & endorses paresthesias. Seen by his foot doctor & started on naproxen & gabapentin 300 TID, but reports symptoms are not improving. Reports pain was so bad this AM he was unable to walk. Reports pain is still significant, but is able to ambulate at this time. Denies saddle anesthesia, incontinence, bowel/bladder retention, dysuria, hematuria, F/C, unexplained weight loss, night sweats, generalized weakness, fatigue or recent falls. VSS on arrival w/ NAD.    Review of patient's allergies indicates:  No Known Allergies  No past medical history on file.  Past Surgical History:   Procedure Laterality Date    BACK SURGERY  1996    CATARACT EXTRACTION      LEFT FOOT SURGERY  2016    LEFT LENS SURGERY       Family History   Problem Relation Age of Onset    Asthma Mother     Heart attack Father      Social History     Tobacco Use    Smoking status: Some Days     Types: Cigarettes    Smokeless tobacco: Never    Tobacco comments:      ~ 4 cigarettes daily   Substance Use Topics    Alcohol use: Not Currently    Drug use: Not Currently     Review of Systems   Constitutional:  Negative for appetite change, chills, diaphoresis, fatigue, fever and unexpected weight change.   HENT:  Negative for congestion, rhinorrhea and sore throat.    Eyes:  Negative for visual disturbance.   Respiratory:  Negative for cough and shortness of breath.    Cardiovascular:  Negative for chest pain, palpitations and leg swelling.   Gastrointestinal:  Negative for " abdominal distention, abdominal pain, blood in stool, constipation, diarrhea, nausea and vomiting.   Endocrine: Negative for polydipsia, polyphagia and polyuria.   Genitourinary:  Negative for decreased urine volume, difficulty urinating, dysuria, enuresis, flank pain, frequency, hematuria and urgency.   Musculoskeletal:  Positive for back pain (R buttocks), gait problem (due to pain) and myalgias. Negative for arthralgias, joint swelling, neck pain and neck stiffness.   Skin:  Negative for pallor and rash.   Allergic/Immunologic: Negative for immunocompromised state.   Neurological:  Positive for numbness (paresthesia of RLE, denies numbness). Negative for dizziness, syncope, weakness and headaches.   All other systems reviewed and are negative.    Physical Exam     Initial Vitals [11/20/22 1644]   BP Pulse Resp Temp SpO2   (!) 183/87 98 18 98.4 °F (36.9 °C) 98 %      MAP       --         Physical Exam    Nursing note and vitals reviewed.  Constitutional: He appears well-developed and well-nourished. He is not diaphoretic. No distress.   HENT:   Head: Normocephalic and atraumatic.   Eyes: Conjunctivae and EOM are normal. Pupils are equal, round, and reactive to light.   Neck: Neck supple.   Normal range of motion.  Cardiovascular:  Normal rate, regular rhythm, normal heart sounds and intact distal pulses.     Exam reveals no gallop and no friction rub.       No murmur heard.  Pulmonary/Chest: Breath sounds normal. No respiratory distress. He has no wheezes. He has no rhonchi. He has no rales.   Abdominal: Abdomen is soft. Bowel sounds are normal. He exhibits no distension. There is no abdominal tenderness. There is no rebound and no guarding.   Musculoskeletal:         General: No edema. Normal range of motion.      Cervical back: Normal range of motion and neck supple.      Lumbar back: No swelling, deformity, signs of trauma, spasms, tenderness or bony tenderness. Normal range of motion. Negative left straight  leg raise test.     Neurological: He is alert and oriented to person, place, and time. He has normal strength. No cranial nerve deficit or sensory deficit. He exhibits normal muscle tone.   Reflex Scores:       Patellar reflexes are 2+ on the right side and 2+ on the left side.  Skin: Skin is warm and dry. Capillary refill takes less than 2 seconds. No erythema. No pallor.   Psychiatric: He has a normal mood and affect.       ED Course   Procedures  Labs Reviewed - No data to display       Imaging Results              X-Ray Lumbar Spine Ap And Lateral (Preliminary result)  Result time 11/20/22 17:53:14      ED Interpretation by SHELBIE Fernandez (11/20/22 17:53:14, [facility], Emergency Medicine)    Severe degenerative changes. No evidence of acute fracture.                                     Medications   methylPREDNISolone sodium succinate injection 125 mg (125 mg Intramuscular Given 11/20/22 1723)   ketorolac injection 30 mg (30 mg Intramuscular Given 11/20/22 1723)     Medical Decision Making:   Clinical Tests:   Radiological Study: Ordered and Reviewed  XR of lumbar spine reveals severe degenerative changes, but no acute fracture. Physical exam consistent w/ sciatica w/o signs of cauda equina. Patient given IM toradol & solumedrol in ED. Instructed to increase gabapentin to QID & discussed that this is a med which works over time. Will discharge w/ short course of steroids & muscle relaxer. Instructed to follow-up w/ PCP. ED precautions given.                        Clinical Impression:   Final diagnoses:  [M54.31] Right sided sciatica (Primary)      ED Disposition Condition    Discharge Stable          ED Prescriptions       Medication Sig Dispense Start Date End Date Auth. Provider    predniSONE (DELTASONE) 20 MG tablet Take 1 tablet (20 mg total) by mouth 2 (two) times daily. for 3 days 6 tablet 11/20/2022 11/23/2022 SHELBIE Fernandez    baclofen (LIORESAL) 20 MG tablet Take 1  tablet (20 mg total) by mouth 2 (two) times daily as needed (leg pain). 15 tablet 11/20/2022 -- SHELBIE Fernandez          Follow-up Information       Follow up With Specialties Details Why Contact Info    PCP  Call in 1 day      [facility] Emergency Medicine  If symptoms worsen [address] [facility] [address] [phone]             SHELBIE Fernandez  11/20/22 9539

## 2022-11-20 NOTE — DISCHARGE INSTRUCTIONS
Report to Emergency Department if symptoms return or worsen; [facility] Within 1 to 2 days, It is important that you follow up with your primary care provider or specialist if indicated for further evaluation, workup, and treatment as necessary. The exam and treatment you received in Emergency Department was for an urgent problem and NOT INTENDED AS COMPLETE CARE. It is important that you FOLLOW UP with a doctor for ongoing care. If your symptoms become WORSE or you DO NOT IMPROVE and you are unable to reach your health care provider, you should RETURN to the Emergency Department. The Emergency Department provider has provided a PRELIMINARY INTERPRETATION of all your studies. A final interpretation may be done after you are discharged. If a change in your diagnosis or treatment is needed WE WILL CONTACT YOU. It is critical that we have a CURRENT PHONE NUMBER FOR YOU.

## 2022-11-23 ENCOUNTER — PATIENT MESSAGE (OUTPATIENT)
Dept: INTERNAL MEDICINE | Facility: CLINIC | Age: 72
End: 2022-11-23
Payer: COMMERCIAL

## 2022-11-23 RX ORDER — TRAMADOL HYDROCHLORIDE 50 MG/1
50 TABLET ORAL EVERY 12 HOURS PRN
Qty: 10 EACH | Refills: 0 | OUTPATIENT
Start: 2022-11-23 | End: 2022-11-27

## 2022-11-26 ENCOUNTER — HOSPITAL ENCOUNTER (EMERGENCY)
Facility: HOSPITAL | Age: 72
Discharge: HOME | End: 2022-11-27
Attending: EMERGENCY MEDICINE
Payer: COMMERCIAL

## 2022-11-26 DIAGNOSIS — I73.9 PERIPHERAL ARTERIAL DISEASE: ICD-10-CM

## 2022-11-26 DIAGNOSIS — R60.0 BILATERAL LOWER EXTREMITY EDEMA: ICD-10-CM

## 2022-11-26 DIAGNOSIS — M79.89 RIGHT LEG SWELLING: ICD-10-CM

## 2022-11-26 DIAGNOSIS — M79.89 LEG SWELLING: Primary | ICD-10-CM

## 2022-11-26 DIAGNOSIS — R52 PAIN: ICD-10-CM

## 2022-11-26 DIAGNOSIS — M10.9 ACUTE GOUT OF RIGHT ANKLE, UNSPECIFIED CAUSE: ICD-10-CM

## 2022-11-26 LAB
ALBUMIN SERPL-MCNC: 3.5 GM/DL (ref 3.4–4.8)
ALBUMIN/GLOB SERPL: 1.1 RATIO (ref 1.1–2)
ALP SERPL-CCNC: 76 UNIT/L (ref 40–150)
ALT SERPL-CCNC: 44 UNIT/L (ref 0–55)
AST SERPL-CCNC: 72 UNIT/L (ref 5–34)
BASOPHILS # BLD AUTO: 0.04 X10(3)/MCL (ref 0–0.2)
BASOPHILS NFR BLD AUTO: 0.5 %
BILIRUBIN DIRECT+TOT PNL SERPL-MCNC: 0.3 MG/DL
BNP BLD-MCNC: 92.9 PG/ML
BUN SERPL-MCNC: 21.2 MG/DL (ref 8.4–25.7)
CALCIUM SERPL-MCNC: 9.5 MG/DL (ref 8.8–10)
CHLORIDE SERPL-SCNC: 105 MMOL/L (ref 98–107)
CO2 SERPL-SCNC: 27 MMOL/L (ref 23–31)
CREAT SERPL-MCNC: 1.34 MG/DL (ref 0.73–1.18)
D DIMER PPP IA.FEU-MCNC: 2.98 UG/ML FEU (ref 0–0.5)
EOSINOPHIL # BLD AUTO: 0.18 X10(3)/MCL (ref 0–0.9)
EOSINOPHIL NFR BLD AUTO: 2.1 %
ERYTHROCYTE [DISTWIDTH] IN BLOOD BY AUTOMATED COUNT: 14.5 % (ref 11.5–17)
GFR SERPLBLD CREATININE-BSD FMLA CKD-EPI: 57 MLS/MIN/1.73/M2
GLOBULIN SER-MCNC: 3.2 GM/DL (ref 2.4–3.5)
GLUCOSE SERPL-MCNC: 96 MG/DL (ref 82–115)
HCT VFR BLD AUTO: 37.2 % (ref 42–52)
HGB BLD-MCNC: 12.3 GM/DL (ref 14–18)
IMM GRANULOCYTES # BLD AUTO: 0.14 X10(3)/MCL (ref 0–0.04)
IMM GRANULOCYTES NFR BLD AUTO: 1.6 %
LYMPHOCYTES # BLD AUTO: 3.11 X10(3)/MCL (ref 0.6–4.6)
LYMPHOCYTES NFR BLD AUTO: 35.4 %
MCH RBC QN AUTO: 32.1 PG (ref 27–31)
MCHC RBC AUTO-ENTMCNC: 33.1 MG/DL (ref 33–36)
MCV RBC AUTO: 97.1 FL (ref 80–94)
MONOCYTES # BLD AUTO: 0.89 X10(3)/MCL (ref 0.1–1.3)
MONOCYTES NFR BLD AUTO: 10.1 %
NEUTROPHILS # BLD AUTO: 4.4 X10(3)/MCL (ref 2.1–9.2)
NEUTROPHILS NFR BLD AUTO: 50.3 %
NRBC BLD AUTO-RTO: 0 %
PLATELET # BLD AUTO: 187 X10(3)/MCL (ref 130–400)
PMV BLD AUTO: 11.6 FL (ref 7.4–10.4)
POTASSIUM SERPL-SCNC: 4.2 MMOL/L (ref 3.5–5.1)
PROT SERPL-MCNC: 6.7 GM/DL (ref 5.8–7.6)
RBC # BLD AUTO: 3.83 X10(6)/MCL (ref 4.7–6.1)
SODIUM SERPL-SCNC: 140 MMOL/L (ref 136–145)
URATE SERPL-MCNC: 8.2 MG/DL (ref 3.5–7.2)
WBC # SPEC AUTO: 8.8 X10(3)/MCL (ref 4.5–11.5)

## 2022-11-26 PROCEDURE — 84550 ASSAY OF BLOOD/URIC ACID: CPT | Performed by: EMERGENCY MEDICINE

## 2022-11-26 PROCEDURE — 85379 FIBRIN DEGRADATION QUANT: CPT | Performed by: EMERGENCY MEDICINE

## 2022-11-26 PROCEDURE — 80053 COMPREHEN METABOLIC PANEL: CPT | Performed by: EMERGENCY MEDICINE

## 2022-11-26 PROCEDURE — 83880 ASSAY OF NATRIURETIC PEPTIDE: CPT | Performed by: EMERGENCY MEDICINE

## 2022-11-26 PROCEDURE — 85025 COMPLETE CBC W/AUTO DIFF WBC: CPT | Performed by: EMERGENCY MEDICINE

## 2022-11-26 PROCEDURE — 99285 EMERGENCY DEPT VISIT HI MDM: CPT | Mod: 25

## 2022-11-26 RX ORDER — HYDROCODONE BITARTRATE AND ACETAMINOPHEN 7.5; 325 MG/1; MG/1
1 TABLET ORAL EVERY 6 HOURS PRN
Status: DISCONTINUED | OUTPATIENT
Start: 2022-11-27 | End: 2022-11-27 | Stop reason: HOSPADM

## 2022-11-26 RX ADMIN — HYDROCODONE BITARTRATE AND ACETAMINOPHEN 1 TABLET: 7.5; 325 TABLET ORAL at 11:11

## 2022-11-27 VITALS
DIASTOLIC BLOOD PRESSURE: 97 MMHG | SYSTOLIC BLOOD PRESSURE: 174 MMHG | HEART RATE: 78 BPM | HEIGHT: 68 IN | BODY MASS INDEX: 26.4 KG/M2 | WEIGHT: 174.19 LBS | RESPIRATION RATE: 17 BRPM | OXYGEN SATURATION: 99 % | TEMPERATURE: 99 F

## 2022-11-27 LAB
RIGHT ANT TIBIAL SYS PSV: 11 CM/S
RIGHT CFA PSV: 59 CM/S
RIGHT DORSALIS PEDIS PSV: 5 CM/S
RIGHT EXTERNAL ILLIAC PSV: 91 CM/S
RIGHT POPLITEAL PSV: 0 CM/S
RIGHT POST TIBIAL SYS PSV: 30 CM/S
RIGHT PROFUNDA SYS PSV: 155 CM/S
RIGHT SUPER FEMORAL DIST SYS PSV: 16 CM/S
RIGHT SUPER FEMORAL MID SYS PSV: 0 CM/S
RIGHT SUPER FEMORAL PROX SYS PSV: 37 CM/S

## 2022-11-27 PROCEDURE — 25000003 PHARM REV CODE 250: Performed by: PHYSICIAN ASSISTANT

## 2022-11-27 RX ORDER — HYDROCODONE BITARTRATE AND ACETAMINOPHEN 5; 325 MG/1; MG/1
1 TABLET ORAL EVERY 8 HOURS PRN
Qty: 15 TABLET | Refills: 0 | Status: SHIPPED | OUTPATIENT
Start: 2022-11-27 | End: 2022-12-05

## 2022-11-27 RX ORDER — PREDNISONE 20 MG/1
40 TABLET ORAL DAILY
Qty: 10 TABLET | Refills: 0 | Status: SHIPPED | OUTPATIENT
Start: 2022-11-27 | End: 2022-12-05

## 2022-11-27 RX ORDER — ASPIRIN 81 MG/1
81 TABLET ORAL DAILY
Qty: 30 TABLET | Refills: 3 | Status: SHIPPED | OUTPATIENT
Start: 2022-11-27 | End: 2023-11-27

## 2022-11-27 RX ORDER — INDOMETHACIN 25 MG/1
25 CAPSULE ORAL
Qty: 15 CAPSULE | Refills: 0 | Status: SHIPPED | OUTPATIENT
Start: 2022-11-27 | End: 2022-12-05 | Stop reason: SDUPTHER

## 2022-11-27 NOTE — ED PROVIDER NOTES
Encounter Date: 11/26/2022       History     Chief Complaint   Patient presents with    Leg Swelling     Pt c/o right lower leg swelling, he states he has been seen 2 x in the past week for same c/o. He denies any injury.      He returns for persistent lower extremity symptoms, recent notes reviewed.  Seen for right lower leg pain and bilateral lower leg swelling without injury, diagnosis has not been made definitively.  No known history of gout or DVT.  He describes some pain in the right knee and both ankles and some swelling in both distal lower extremities.  Denies chest pain, dyspnea, hemoptysis, pleurisy, or any no history of renal failure or heart failure.  Recent creatinine was very slightly elevated.  He feels he has gradually gotten somewhat worse but there are no other acute events preceding tonight visit.  No other specific complaints.    The history is provided by the patient and the spouse. No  was used.   Review of patient's allergies indicates:  No Known Allergies  Past Medical History:   Diagnosis Date    Hypertension     Mixed hyperlipidemia      Past Surgical History:   Procedure Laterality Date    BACK SURGERY  1996    CATARACT EXTRACTION      LEFT FOOT SURGERY  2016    LEFT LENS SURGERY       Family History   Problem Relation Age of Onset    Asthma Mother     Heart attack Father      Social History     Tobacco Use    Smoking status: Some Days     Types: Cigarettes    Smokeless tobacco: Never    Tobacco comments:      ~ 4 cigarettes daily   Substance Use Topics    Alcohol use: Not Currently    Drug use: Not Currently     Review of Systems   Constitutional:  Negative for chills and fever.   HENT:  Negative for congestion, facial swelling, nosebleeds and sinus pressure.    Eyes:  Negative for pain and redness.   Respiratory:  Negative for chest tightness, shortness of breath and wheezing.    Cardiovascular:  Positive for leg swelling. Negative for chest pain and palpitations.    Gastrointestinal:  Negative for abdominal distention, abdominal pain, diarrhea, nausea and vomiting.   Endocrine: Negative for cold intolerance, polydipsia and polyphagia.   Genitourinary:  Negative for difficulty urinating, dysuria, frequency and hematuria.   Musculoskeletal:  Positive for arthralgias. Negative for back pain, myalgias and neck pain.   Skin:  Negative for color change and rash.   Neurological:  Negative for dizziness, weakness, numbness and headaches.   Hematological:  Negative for adenopathy. Does not bruise/bleed easily.   Psychiatric/Behavioral:  Negative for agitation and behavioral problems.    All other systems reviewed and are negative.    Physical Exam     Initial Vitals [11/26/22 2129]   BP Pulse Resp Temp SpO2   (!) 167/88 73 17 98.8 °F (37.1 °C) 99 %      MAP       --         Physical Exam    Nursing note and vitals reviewed.  Constitutional: He appears well-developed and well-nourished. He is not diaphoretic. No distress.   HENT:   Head: Normocephalic and atraumatic.   Mouth/Throat: Oropharynx is clear and moist. No oropharyngeal exudate.   Eyes: Conjunctivae and EOM are normal. Pupils are equal, round, and reactive to light. Right eye exhibits no discharge. Left eye exhibits no discharge. No scleral icterus.   Neck: Neck supple. No thyromegaly present. No tracheal deviation present. No JVD present.   Normal range of motion.  Cardiovascular:  Normal rate, regular rhythm and normal heart sounds.     Exam reveals no gallop and no friction rub.       No murmur heard.  Intact distal capillary refill to all digits, no signs of ischemia.   Pulmonary/Chest: Breath sounds normal. No respiratory distress. He has no wheezes. He has no rhonchi. He has no rales. He exhibits no tenderness.   Abdominal: Abdomen is soft. Bowel sounds are normal. He exhibits no distension and no mass. There is no abdominal tenderness. There is no rebound and no guarding.   Musculoskeletal:         General: Edema  present. No tenderness. Normal range of motion.      Cervical back: Normal range of motion and neck supple.      Comments: One or 2+ symmetric pitting bilateral lower extremity pretibial edema to the level of the upper calf.  Mild, nonspecific tenderness to the right knee and both ankles, none of the joints in question demonstrate erythema, effusion, or significant pain on range of motion testing.     Lymphadenopathy:     He has no cervical adenopathy.   Neurological: He is alert and oriented to person, place, and time. He has normal strength. No cranial nerve deficit.   Skin: Skin is warm and dry. No rash noted. No erythema.   Psychiatric: He has a normal mood and affect. His behavior is normal. Judgment and thought content normal.       ED Course   Procedures  Labs Reviewed   COMPREHENSIVE METABOLIC PANEL - Abnormal; Notable for the following components:       Result Value    Creatinine 1.34 (*)     Aspartate Aminotransferase 72 (*)     All other components within normal limits   D DIMER, QUANTITATIVE - Abnormal; Notable for the following components:    D-Dimer 2.98 (*)     All other components within normal limits   URIC ACID - Abnormal; Notable for the following components:    Uric Acid 8.2 (*)     All other components within normal limits   CBC WITH DIFFERENTIAL - Abnormal; Notable for the following components:    RBC 3.83 (*)     Hgb 12.3 (*)     Hct 37.2 (*)     MCV 97.1 (*)     MCH 32.1 (*)     MPV 11.6 (*)     IG# 0.14 (*)     All other components within normal limits   B-TYPE NATRIURETIC PEPTIDE - Normal   CBC W/ AUTO DIFFERENTIAL    Narrative:     The following orders were created for panel order CBC auto differential.  Procedure                               Abnormality         Status                     ---------                               -----------         ------                     CBC with Differential[[id number]]        Abnormal            Final result                 Please view results for  these tests on the individual orders.   EXTRA TUBES    Narrative:     The following orders were created for panel order EXTRA TUBES.  Procedure                               Abnormality         Status                     ---------                               -----------         ------                     Light Green Top Hold[[id number]]                             In process                 Gold Top Hold[[id number]]                                    In process                   Please view results for these tests on the individual orders.   LIGHT GREEN TOP HOLD   GOLD TOP HOLD          Imaging Results              X-Ray Tibia Fibula 2 View Right (Preliminary result)  Result time 11/26/22 23:46:31      ED Interpretation by Katharine Lovell PA-C (11/26/22 23:46:31, [facility], Emergency Medicine)    No acute fracture or dislocation                                     X-Ray Knee 1 or 2 View Right (Preliminary result)  Result time 11/26/22 23:47:48   Procedure changed from X-Ray Knee Complete 4 Or More Views Right     ED Interpretation by Katharine Lovell PA-C (11/26/22 23:47:48, [facility])    No acute fracture or dislocation                                     X-Ray Chest PA And Lateral (Preliminary result)  Result time 11/26/22 23:46:53      ED Interpretation by Katharine Lovell PA-C (11/26/22 23:46:53, [facility])    No acute cardiopulmonary process identified                                     X-Ray Ankle Complete Right (Preliminary result)  Result time 11/26/22 23:47:26      ED Interpretation by Katharine Lovell PA-C (11/26/22 23:47:26, [facility])    No acute fracture or dislocation                                     Medications   HYDROcodone-acetaminophen 7.5-325 mg per tablet 1 tablet (1 tablet Oral Given 11/26/22 5538)                  ED Course as of 11/27/22 0153   Sun Nov 27, 2022   0015 Took over care of patient from Dr. Jessica at 2300 pending XR and labs. D-dimer elevated so venous US LE ordered. Arterial US also ordered for right leg due to cool foot in comparison to left and dec pulses. Uric acid 8.2, possible gout arthritis. [KD]   0131 Venous US negative for DVT. Arterial US of R leg preliminary report shows chronic occlusions with collateral flow, and trickle flow of dorsalis pedis. No lesions or dusky toes of right foot. Will start patient on ASA and send urgent referral to vascular surgery. Will also treat for gout with prednisone for uric acid and polyarthralgia [KD]   0151 Patient reports significant improvement in pain with norco. Was previously prescribed tramadol but reports no improvement. Will give him short course of norco. Discussed ultrasound findings, gout diagnosis, and treatment plan with patient, his wife, and his daughter who is a nurse. They are all in agreement with plan. Patient verbalized understanding. All questions answered.  [KD]      ED Course User Index  [KD] Katharine Lovell PA-C                 Clinical Impression:   Final diagnoses:  [R52] Pain  [M79.89] Leg swelling - Bilateral (Primary)  [R60.0] Bilateral lower extremity edema  [M79.89] Right leg swelling  [I73.9] Peripheral arterial disease  [M10.9] Acute gout of right ankle, unspecified cause      ED Disposition Condition    Discharge Stable          ED Prescriptions       Medication Sig Dispense Start Date End Date Auth. Provider    aspirin (ECOTRIN) 81 MG EC tablet Take 1 tablet (81 mg total) by mouth once daily. 30 tablet 11/27/2022 11/27/2023 Katharine Lovell PA-C    indomethacin (INDOCIN) 25 MG capsule Take 1 capsule (25 mg total) by mouth 3 (three) times daily with meals. 15 capsule 11/27/2022 -- Katharine Lovell PA-C    predniSONE (DELTASONE) 20 MG tablet Take 2 tablets (40 mg total) by mouth once daily. for 5 days 10 tablet  11/27/2022 12/2/2022 Katharine Lovell PA-C    HYDROcodone-acetaminophen (NORCO) 5-325 mg per tablet Take 1 tablet by mouth every 8 (eight) hours as needed for Pain. 15 tablet 11/27/2022 -- Katahrine Lovell PA-C          Follow-up Information       Follow up With Specialties Details Why Contact Info    [facility] Emergency Medicine  If symptoms worsen [address] [facility] [address] [phone]    PCP  Schedule an appointment as soon as possible for a visit                Katharine Lovell PA-C  11/27/22 0154

## 2022-12-05 ENCOUNTER — OFFICE VISIT (OUTPATIENT)
Dept: INTERNAL MEDICINE | Facility: CLINIC | Age: 72
End: 2022-12-05
Payer: COMMERCIAL

## 2022-12-05 VITALS
HEART RATE: 78 BPM | DIASTOLIC BLOOD PRESSURE: 72 MMHG | BODY MASS INDEX: 27.89 KG/M2 | TEMPERATURE: 98 F | RESPIRATION RATE: 20 BRPM | WEIGHT: 184 LBS | SYSTOLIC BLOOD PRESSURE: 144 MMHG | HEIGHT: 68 IN

## 2022-12-05 DIAGNOSIS — I10 HYPERTENSION, UNSPECIFIED TYPE: ICD-10-CM

## 2022-12-05 DIAGNOSIS — R74.01 TRANSAMINITIS: ICD-10-CM

## 2022-12-05 DIAGNOSIS — I73.9 PAD (PERIPHERAL ARTERY DISEASE): ICD-10-CM

## 2022-12-05 DIAGNOSIS — E79.0 HYPERURICEMIA: ICD-10-CM

## 2022-12-05 DIAGNOSIS — F17.200 NEEDS SMOKING CESSATION EDUCATION: ICD-10-CM

## 2022-12-05 DIAGNOSIS — M79.604 PAIN OF RIGHT LOWER EXTREMITY: Primary | ICD-10-CM

## 2022-12-05 DIAGNOSIS — M10.9 GOUT, UNSPECIFIED CAUSE, UNSPECIFIED CHRONICITY, UNSPECIFIED SITE: ICD-10-CM

## 2022-12-05 PROCEDURE — 3288F PR FALLS RISK ASSESSMENT DOCUMENTED: ICD-10-PCS | Mod: CPTII,,, | Performed by: NURSE PRACTITIONER

## 2022-12-05 PROCEDURE — 1101F PT FALLS ASSESS-DOCD LE1/YR: CPT | Mod: CPTII,,, | Performed by: NURSE PRACTITIONER

## 2022-12-05 PROCEDURE — 3078F PR MOST RECENT DIASTOLIC BLOOD PRESSURE < 80 MM HG: ICD-10-PCS | Mod: CPTII,,, | Performed by: NURSE PRACTITIONER

## 2022-12-05 PROCEDURE — 3077F PR MOST RECENT SYSTOLIC BLOOD PRESSURE >= 140 MM HG: ICD-10-PCS | Mod: CPTII,,, | Performed by: NURSE PRACTITIONER

## 2022-12-05 PROCEDURE — 1125F PR PAIN SEVERITY QUANTIFIED, PAIN PRESENT: ICD-10-PCS | Mod: CPTII,,, | Performed by: NURSE PRACTITIONER

## 2022-12-05 PROCEDURE — 1159F MED LIST DOCD IN RCRD: CPT | Mod: CPTII,,, | Performed by: NURSE PRACTITIONER

## 2022-12-05 PROCEDURE — 99214 OFFICE O/P EST MOD 30 MIN: CPT | Mod: S$PBB,,, | Performed by: NURSE PRACTITIONER

## 2022-12-05 PROCEDURE — 3078F DIAST BP <80 MM HG: CPT | Mod: CPTII,,, | Performed by: NURSE PRACTITIONER

## 2022-12-05 PROCEDURE — 1101F PR PT FALLS ASSESS DOC 0-1 FALLS W/OUT INJ PAST YR: ICD-10-PCS | Mod: CPTII,,, | Performed by: NURSE PRACTITIONER

## 2022-12-05 PROCEDURE — 3008F PR BODY MASS INDEX (BMI) DOCUMENTED: ICD-10-PCS | Mod: CPTII,,, | Performed by: NURSE PRACTITIONER

## 2022-12-05 PROCEDURE — 1160F PR REVIEW ALL MEDS BY PRESCRIBER/CLIN PHARMACIST DOCUMENTED: ICD-10-PCS | Mod: CPTII,,, | Performed by: NURSE PRACTITIONER

## 2022-12-05 PROCEDURE — 99215 OFFICE O/P EST HI 40 MIN: CPT | Mod: PBBFAC | Performed by: NURSE PRACTITIONER

## 2022-12-05 PROCEDURE — 1125F AMNT PAIN NOTED PAIN PRSNT: CPT | Mod: CPTII,,, | Performed by: NURSE PRACTITIONER

## 2022-12-05 PROCEDURE — 4010F PR ACE/ARB THEARPY RXD/TAKEN: ICD-10-PCS | Mod: CPTII,,, | Performed by: NURSE PRACTITIONER

## 2022-12-05 PROCEDURE — 99214 PR OFFICE/OUTPT VISIT, EST, LEVL IV, 30-39 MIN: ICD-10-PCS | Mod: S$PBB,,, | Performed by: NURSE PRACTITIONER

## 2022-12-05 PROCEDURE — 3077F SYST BP >= 140 MM HG: CPT | Mod: CPTII,,, | Performed by: NURSE PRACTITIONER

## 2022-12-05 PROCEDURE — 1160F RVW MEDS BY RX/DR IN RCRD: CPT | Mod: CPTII,,, | Performed by: NURSE PRACTITIONER

## 2022-12-05 PROCEDURE — 3288F FALL RISK ASSESSMENT DOCD: CPT | Mod: CPTII,,, | Performed by: NURSE PRACTITIONER

## 2022-12-05 PROCEDURE — 3008F BODY MASS INDEX DOCD: CPT | Mod: CPTII,,, | Performed by: NURSE PRACTITIONER

## 2022-12-05 PROCEDURE — 4010F ACE/ARB THERAPY RXD/TAKEN: CPT | Mod: CPTII,,, | Performed by: NURSE PRACTITIONER

## 2022-12-05 PROCEDURE — 1159F PR MEDICATION LIST DOCUMENTED IN MEDICAL RECORD: ICD-10-PCS | Mod: CPTII,,, | Performed by: NURSE PRACTITIONER

## 2022-12-05 RX ORDER — GABAPENTIN 300 MG/1
600 CAPSULE ORAL 3 TIMES DAILY
Qty: 540 CAPSULE | Refills: 1 | Status: SHIPPED | OUTPATIENT
Start: 2022-12-05 | End: 2023-06-03

## 2022-12-05 RX ORDER — ALLOPURINOL 100 MG/1
100 TABLET ORAL DAILY
Qty: 90 TABLET | Refills: 1 | Status: SHIPPED | OUTPATIENT
Start: 2022-12-05 | End: 2023-06-03

## 2022-12-05 RX ORDER — ACETAMINOPHEN AND CODEINE PHOSPHATE 300; 30 MG/1; MG/1
1 TABLET ORAL EVERY 8 HOURS PRN
Qty: 15 TABLET | Refills: 0 | Status: SHIPPED | OUTPATIENT
Start: 2022-12-05 | End: 2022-12-16 | Stop reason: ALTCHOICE

## 2022-12-05 RX ORDER — INDOMETHACIN 25 MG/1
25 CAPSULE ORAL
Qty: 30 CAPSULE | Refills: 0 | Status: SHIPPED | OUTPATIENT
Start: 2022-12-05 | End: 2022-12-15 | Stop reason: SDUPTHER

## 2022-12-05 NOTE — ASSESSMENT & PLAN NOTE
Slightly above goal today after being at goal at October appt. Likely 2/2 pain. Asymptomatic  Continue current regimen  DASH

## 2022-12-05 NOTE — ASSESSMENT & PLAN NOTE
Indocin to be used very sparingly  Tylenol #3 for severe pain only. Only one-time Rx  Pain likely 2/2 arterial occlusion. Referring to Dr. Spivey

## 2022-12-05 NOTE — PROGRESS NOTES
REGIS Peña   [facility]  [address] [facility] [id number]      PATIENT NAME: Jose De Jesus Zaidi  : 1950  DATE: 22  MRN: [id number]      Billing Provider: REGIS Peña  Level of Service:   Patient PCP Information       Provider PCP Type    REGIS Peña General            Reason for Visit / Chief Complaint: Gout (Right leg pain)       History of Present Illness / Problem Focused Workflow     Jose De Jesus Zaidi presents to the clinic with Gout (Right leg pain)     Initial OV (19): 69 y.o. male presenting to [facility] to establish primary care.     Previous PCP: Dr. CRISTEAL Calvo. Last OV 19   PmHx: HTN, CKD Stage 2, Left Cataract, and Tobacco use   Bp at goal today. Taking HCTZ-Lisinopril 12.5mg-20mg po daily. Renal indices stable. Smoking ~ 1/2 PPD. Quit for 20 years at one point, but started smoking again 8 years ago following a back injury. He is not ready to quit but is amenable to lung cancer screening. He denies fever, chills, night sweats, chest pain, SOB, cough, or wheezing.   SHx: Left Ft Sx, Back Sx, Left Cataract Sx   FHx: Asthma, Heart Disease   Complaints today: No acute concerns    (20): 69 y.o. AA male with PmHx: HTN, CKD Stage 2, Left Cataract, and Tobacco use, presenting for f/u. Renal indices stable. Hep C ab was reactive. Hx of chronic hep C. Completed 12 wks of Harvoni on 17. Will repeat viral load. Also, pt was supposed to be undergoing an abd US q6 mths. Last abd US 18-negative study. No acute complaints today.    Health Maintenance:   AAA (19)- negative   FIT (10/27/19)- negative   PSA (2020)- 0.3   Colonoscopy- previously referred to GI lab 2019     S/p corneal transplant surgery 2020.    Telephone Visit (20): 69 y.o. AA male with PmHx of HTN, Chronic Hep C (s/p Harvoni), CKD Stage 2, Left Cataract, and Tobacco use, presenting for f/u via telephone. Patient has  "consented to telephone visit and was able to verify specific patient identification. I have verified that only myself and pt are on the telephone call and call is taking place in the state of [place]. Pt continues with prescription medications. CMP/renal indices and CBC stable. HCV RNA negative. Abd US unremarkable. He declined lung cancer screening. He mentions that his left foot has been bothering him, but states, "I need to call and schedule a appointment with the foot doctor. I just wanted to let you know." He declines any work-up at this time. States if he has issues seeing the podiatrist, he will contact the clinic for an evaluation. He has no other concerns.    (3/11/21): 70 y.o. AA male with PmHx of HTN, Chronic Hep C (s/p Harvoni), CKD Stage 2, Left Cataract, and Tobacco use, presenting for f/u. Bp at goal on repeat assessment. States taking HCTZ-Lisinopril 12.5mg-20mg po daily as prescribed. Tolerating well. Labs reviewed and unremarkable/stable. FIT is due. Last FIT 10/2019, negative. He denies any bowel-related complaints, black or tarry stools. Denies issues with urination. Ambulates independently and is ADL-independent. PSA 0.23, WNL. Pt requesting medication refills. Due for 2nd COVID vaccine at the end of this month. Tolerated first vaccination relatively well aside from mild arm pain. He has no acute concerns.    Telephone Visit (9/13/21): 71 y.o. AA male with PmHx of HTN, Chronic Hep C (s/p Harvoni), CKD Stage 2, Left Cataract, and Tobacco use, presenting for f/u. Labs not completed. States taking HCTZ-Lisinopril 12.5mg-20mg po daily as prescribed. Tolerating well. Never completed FIT. Last done 2019, negative. He denies any bowel-related complaints, black or tarry stools. Denies issues with urination. Ambulates independently and is ADL-independent. No falls since last visit. He went to ED in July with c/o painful foot calluses. Had been treated by podiatry in the past. He was referred to " "[facility] for a new eval. States plans to make an appt next week. No other concerns.    (4/28/22): 72 y.o. AA male with PmHx of HTN, Chronic Hep C (s/p Harvoni), CKD Stage 2, Left Cataract, and Tobacco use, presenting for f/u. Bp at goal. States taking HCTZ-Lisinopril 12.5mg-20mg po daily as prescribed. Tolerating well. Labs reviewed and unremarkable/stable aside from slight elevations in total cholesterol and LDL. PSA 0.24. He does c/o awakening ~2-3x to urinate at night. Also feels bladder doesn't empty completely on occasion. Pt takes HCTZ. He also drinks mx bottles of water a day, up until bedtime. No other concerns.    Health Maintenance:   Colon Ca Screening-Cologuard 10/7/2021, negative   Prostate Ca Screening-PSA 0.24    (10/27/22): 72 y.o. AA male with PmHx of HTN, Chronic Hep C (s/p Harvoni), CKD Stage 2, Left Cataract, and Tobacco use, presenting for f/u. Bp at goal. States taking HCTZ-Lisinopril 12.5mg-20mg po daily as prescribed. Tolerating well. He forgot to complete labs, states "didn't know." Started on Flomax last visit for LUTS. Sx improved. Reports doing better. Amenable to receiving influenza and pneumo vax. No falls, cognitive changes, B/B incontinence of any other concerns today.    Today's Visit (12/5/22): 72 y.o. AA male with PmHx of HTN, Chronic Hep C (s/p Harvoni), CKD Stage 2, Left Cataract, and Tobacco use, presenting for ED f/u. He's accompanied by spouse, Arleth Zaidi. Mr. Zaidi visited the ED on 11/20/22 with c/o RLE pain, numbness/tingling and was originally diagnosed with sciatica. He then requested Tramadol via portal a few days later when pain persisted. However, Tramadol was ineffective, so he presented to ED 11/26/22 with similar symptoms. In ED, his uric acid was 8.2 and D-dimer 2.98. He also had a cool right foot with a weak pulse. There were concerns about DVT, vascular insuff v gout. He eventually underwent an arterial US of the RLE revealing:  The right lower extremity " demonstrated a transition from multiphasic waveforms to monophasic waveforms beginning at the mid SFA.   The right mid SFA appears to be occluded with collateral vessels visualized and reconstitution at the distal SFA.   The right popliteal artery appears to be occluded with reconstitution at the proximal PTA at the upper calf.  Trickle flow at the right ankle.  The right lower extremity demonstrated moderately severe to severe arterial flow reduction.    He does have a Vascular clinic appt 12/20/22 but is interested in something sooner. He was prescribed Indocin, Prednisone, and hydrocodone. Spouse reports Indocin and hydrocodone proved very effective. He had also been instructed to increase Gabapentin and has been taking 600 mg TID. However, patient ran out of medication 2 days ago, and pain/swelling is returning to RLE.       Review of Systems     Review of Systems   Constitutional: Negative.    HENT: Negative.     Eyes: Negative.    Respiratory: Negative.     Cardiovascular: Negative.    Gastrointestinal: Negative.    Endocrine: Negative.    Genitourinary: Negative.    Musculoskeletal:  Positive for arthralgias.   Skin: Negative.    Allergic/Immunologic: Negative.    Neurological: Negative.    Hematological: Negative.    Psychiatric/Behavioral: Negative.       Medical / Social / Family History     Past Medical History:   Diagnosis Date    Hypertension     Mixed hyperlipidemia        Past Surgical History:   Procedure Laterality Date    BACK SURGERY  1996    CATARACT EXTRACTION      LEFT FOOT SURGERY  2016    LEFT LENS SURGERY         Social History    reports that he has been smoking cigarettes. He has never used smokeless tobacco. He reports that he does not currently use alcohol. He reports that he does not currently use drugs.    Family History  's family history includes Asthma in his mother; Heart attack in his brother, brother, brother, and father; Stomach cancer in his brother.    Medications and  Allergies     Medications  Medication List with Changes/Refills   New Medications    ACETAMINOPHEN-CODEINE 300-30MG (TYLENOL #3) 300-30 MG TAB    Take 1 tablet by mouth every 8 (eight) hours as needed (pain).    ALLOPURINOL (ZYLOPRIM) 100 MG TABLET    Take 1 tablet (100 mg total) by mouth once daily.   Current Medications    ASPIRIN (ECOTRIN) 81 MG EC TABLET    Take 1 tablet (81 mg total) by mouth once daily.    ATORVASTATIN (LIPITOR) 20 MG TABLET    Take 1 tablet (20 mg total) by mouth once daily.    CHLORHEXIDINE (PERIDEX) 0.12 % SOLUTION    Use as directed 15 mLs in the mouth or throat 2 (two) times a day. Swish and spit    LISINOPRIL-HYDROCHLOROTHIAZIDE (PRINZIDE,ZESTORETIC) 20-12.5 MG PER TABLET    Take 1 tablet by mouth once daily.    TAMSULOSIN (FLOMAX) 0.4 MG CAP    Take 1 capsule (0.4 mg total) by mouth once daily.   Changed and/or Refilled Medications    Modified Medication Previous Medication    GABAPENTIN (NEURONTIN) 300 MG CAPSULE gabapentin (NEURONTIN) 300 MG capsule       Take 2 capsules (600 mg total) by mouth 3 (three) times daily.    Take 300 mg by mouth 4 (four) times daily.    INDOMETHACIN (INDOCIN) 25 MG CAPSULE indomethacin (INDOCIN) 25 MG capsule       Take 1 capsule (25 mg total) by mouth 3 (three) times daily with meals. As needed for pain    Take 1 capsule (25 mg total) by mouth 3 (three) times daily with meals.   Discontinued Medications    BACLOFEN (LIORESAL) 20 MG TABLET    Take 1 tablet (20 mg total) by mouth 2 (two) times daily as needed (leg pain).    HYDROCODONE-ACETAMINOPHEN (NORCO) 5-325 MG PER TABLET    Take 1 tablet by mouth every 8 (eight) hours as needed for Pain.    PREDNISONE (DELTASONE) 20 MG TABLET    Take 2 tablets (40 mg total) by mouth once daily. for 5 days           Allergies  Review of patient's allergies indicates:  No Known Allergies    Physical Examination     Vitals:    12/05/22 1341   BP: (!) 144/72   Pulse:    Resp:    Temp:      Physical Exam  Constitutional:        Appearance: Normal appearance.   HENT:      Head: Normocephalic and atraumatic.      Mouth/Throat:      Pharynx: Oropharynx is clear.   Eyes:      Extraocular Movements: Extraocular movements intact.      Conjunctiva/sclera: Conjunctivae normal.      Pupils: Pupils are equal, round, and reactive to light.   Cardiovascular:      Rate and Rhythm: Normal rate and regular rhythm.      Heart sounds: Normal heart sounds.   Pulmonary:      Effort: Pulmonary effort is normal.      Breath sounds: Normal breath sounds.   Musculoskeletal:         General: Normal range of motion.      Cervical back: Normal range of motion.      Right lower leg: Edema (mild, nonpitting) present.      Left lower leg: No edema.   Skin:     General: Skin is warm and dry.   Neurological:      General: No focal deficit present.      Mental Status: He is alert and oriented to person, place, and time.   Psychiatric:         Mood and Affect: Mood normal.         Behavior: Behavior normal.         Thought Content: Thought content normal.         Judgment: Judgment normal.         Results     Lab Results   Component Value Date    WBC 8.8 11/26/2022    RBC 3.83 (L) 11/26/2022    HGB 12.3 (L) 11/26/2022    HCT 37.2 (L) 11/26/2022    MCV 97.1 (H) 11/26/2022    MCH 32.1 (H) 11/26/2022    MCHC 33.1 11/26/2022    RDW 14.5 11/26/2022     11/26/2022    MPV 11.6 (H) 11/26/2022     CMP  Sodium Level   Date Value Ref Range Status   11/26/2022 140 136 - 145 mmol/L Final     Potassium Level   Date Value Ref Range Status   11/26/2022 4.2 3.5 - 5.1 mmol/L Final     Carbon Dioxide   Date Value Ref Range Status   11/26/2022 27 23 - 31 mmol/L Final     Blood Urea Nitrogen   Date Value Ref Range Status   11/26/2022 21.2 8.4 - 25.7 mg/dL Final     Creatinine   Date Value Ref Range Status   11/26/2022 1.34 (H) 0.73 - 1.18 mg/dL Final     Calcium Level Total   Date Value Ref Range Status   11/26/2022 9.5 8.8 - 10.0 mg/dL Final     Albumin Level   Date Value Ref  Range Status   11/26/2022 3.5 3.4 - 4.8 gm/dL Final     Bilirubin Total   Date Value Ref Range Status   11/26/2022 0.3 <=1.5 mg/dL Final     Alkaline Phosphatase   Date Value Ref Range Status   11/26/2022 76 40 - 150 unit/L Final     Aspartate Aminotransferase   Date Value Ref Range Status   11/26/2022 72 (H) 5 - 34 unit/L Final     Alanine Aminotransferase   Date Value Ref Range Status   11/26/2022 44 0 - 55 unit/L Final     Estimated GFR-Non    Date Value Ref Range Status   03/09/2022 68 >=90      Lab Results   Component Value Date    CHOL 243 (H) 11/15/2022     Lab Results   Component Value Date    HDL 51 11/15/2022     No results found for: LDLCALC  Lab Results   Component Value Date    TRIG 93 11/15/2022     No results found for: CHOLHDL  Lab Results   Component Value Date    TSH 1.8183 03/09/2022     Lab Results   Component Value Date    PHUR 6.0 03/09/2022    PROTEINUA Negative 03/09/2022    GLUCUA Normal 03/09/2022    KETONESU Negative 03/09/2022    OCCULTUA Negative 03/09/2022    NITRITE Negative 03/09/2022    LEUKOCYTESUR Negative 03/09/2022           Assessment and Plan (including Health Maintenance)     Plan:         Health Maintenance Due   Topic Date Due    Hepatitis C Screening  Never done    COVID-19 Vaccine (5 - Booster for Pfizer series) 05/27/2022       Problem List Items Addressed This Visit          Cardiac/Vascular    Hypertension    Current Assessment & Plan     Slightly above goal today after being at goal at October appt. Likely 2/2 pain. Asymptomatic  Continue current regimen  DASH         PAD (peripheral artery disease)    Current Assessment & Plan     Refer to Dr. Spivey         Relevant Orders    Ambulatory referral/consult to Cardiovascular Surgery       GI    Transaminitis    Current Assessment & Plan     Mild increase in AST 11/26/22. Repeat CMP in 1-2 weeks         Relevant Orders    Comprehensive Metabolic Panel       Orthopedic    Pain of right lower extremity -  Primary    Current Assessment & Plan     Indocin to be used very sparingly  Tylenol #3 for severe pain only. Only one-time Rx  Pain likely 2/2 arterial occlusion. Referring to Dr. Spivey         Relevant Medications    indomethacin (INDOCIN) 25 MG capsule    gabapentin (NEURONTIN) 300 MG capsule    acetaminophen-codeine 300-30mg (TYLENOL #3) 300-30 mg Tab    Hyperuricemia    Current Assessment & Plan     Uric acid > 8  Start allopurinol 100 mg po daily          Other Visit Diagnoses       Gout, unspecified cause, unspecified chronicity, unspecified site        Relevant Medications    allopurinoL (ZYLOPRIM) 100 MG tablet            Health Maintenance Topics with due status: Not Due       Topic Last Completion Date    TETANUS VACCINE 07/09/2018    Colorectal Cancer Screening 06/28/2021    Lipid Panel 11/15/2022       Future Appointments   Date Time Provider Department Center   12/20/2022  1:00 PM PROVIDER, [facility] VASCULAR SURGERY [facility] CANDY Sands   4/27/2023  1:30 PM REGIS Peña [facility] LORI Vadlez Un            Signature:  REGIS Peña  [facility]  [address] [address] [address]    Date of encounter: 12/5/22

## 2022-12-08 ENCOUNTER — PATIENT MESSAGE (OUTPATIENT)
Dept: INTERNAL MEDICINE | Facility: CLINIC | Age: 72
End: 2022-12-08
Payer: COMMERCIAL

## 2022-12-14 ENCOUNTER — PATIENT MESSAGE (OUTPATIENT)
Dept: INTERNAL MEDICINE | Facility: CLINIC | Age: 72
End: 2022-12-14
Payer: COMMERCIAL

## 2022-12-14 ENCOUNTER — TELEPHONE (OUTPATIENT)
Dept: INTERNAL MEDICINE | Facility: CLINIC | Age: 72
End: 2022-12-14
Payer: COMMERCIAL

## 2022-12-14 NOTE — TELEPHONE ENCOUNTER
----- Message from REGIS Peña sent at 12/5/2022  2:49 PM CST -----  Regarding: Labs  AST was elevated 11/26. Have patient come to lab for CMP. Did he see Allyssa?

## 2022-12-15 ENCOUNTER — PATIENT MESSAGE (OUTPATIENT)
Dept: INTERNAL MEDICINE | Facility: CLINIC | Age: 72
End: 2022-12-15
Payer: COMMERCIAL

## 2022-12-15 RX ORDER — CLOPIDOGREL BISULFATE 75 MG/1
75 TABLET ORAL DAILY
COMMUNITY
Start: 2022-12-12

## 2022-12-16 ENCOUNTER — PATIENT MESSAGE (OUTPATIENT)
Dept: INTERNAL MEDICINE | Facility: CLINIC | Age: 72
End: 2022-12-16
Payer: COMMERCIAL

## 2022-12-16 RX ORDER — TRAMADOL HYDROCHLORIDE 50 MG/1
50 TABLET ORAL EVERY 8 HOURS PRN
Qty: 15 TABLET | Refills: 0 | Status: SHIPPED | OUTPATIENT
Start: 2022-12-16 | End: 2022-12-21

## 2022-12-19 ENCOUNTER — LAB VISIT (OUTPATIENT)
Dept: LAB | Facility: HOSPITAL | Age: 72
End: 2022-12-19
Attending: NURSE PRACTITIONER
Payer: COMMERCIAL

## 2022-12-19 ENCOUNTER — PATIENT MESSAGE (OUTPATIENT)
Dept: INTERNAL MEDICINE | Facility: CLINIC | Age: 72
End: 2022-12-19
Payer: COMMERCIAL

## 2022-12-19 DIAGNOSIS — R74.01 TRANSAMINITIS: ICD-10-CM

## 2022-12-19 LAB
ALBUMIN SERPL-MCNC: 3.8 G/DL (ref 3.4–4.8)
ALBUMIN/GLOB SERPL: 1.1 RATIO (ref 1.1–2)
ALP SERPL-CCNC: 81 UNIT/L (ref 40–150)
ALT SERPL-CCNC: 14 UNIT/L (ref 0–55)
AST SERPL-CCNC: 21 UNIT/L (ref 5–34)
BILIRUBIN DIRECT+TOT PNL SERPL-MCNC: 0.5 MG/DL
BUN SERPL-MCNC: 12.8 MG/DL (ref 8.4–25.7)
CALCIUM SERPL-MCNC: 9.9 MG/DL (ref 8.8–10)
CHLORIDE SERPL-SCNC: 104 MMOL/L (ref 98–107)
CO2 SERPL-SCNC: 28 MMOL/L (ref 23–31)
CREAT SERPL-MCNC: 1.24 MG/DL (ref 0.73–1.18)
GFR SERPLBLD CREATININE-BSD FMLA CKD-EPI: >60 MLS/MIN/1.73/M2
GLOBULIN SER-MCNC: 3.6 GM/DL (ref 2.4–3.5)
GLUCOSE SERPL-MCNC: 97 MG/DL (ref 82–115)
POTASSIUM SERPL-SCNC: 4 MMOL/L (ref 3.5–5.1)
PROT SERPL-MCNC: 7.4 GM/DL (ref 5.8–7.6)
SODIUM SERPL-SCNC: 139 MMOL/L (ref 136–145)

## 2022-12-19 PROCEDURE — 80053 COMPREHEN METABOLIC PANEL: CPT

## 2022-12-19 PROCEDURE — 36415 COLL VENOUS BLD VENIPUNCTURE: CPT

## 2022-12-21 ENCOUNTER — PATIENT MESSAGE (OUTPATIENT)
Dept: INTERNAL MEDICINE | Facility: CLINIC | Age: 72
End: 2022-12-21
Payer: COMMERCIAL

## 2022-12-26 ENCOUNTER — PATIENT MESSAGE (OUTPATIENT)
Dept: INTERNAL MEDICINE | Facility: CLINIC | Age: 72
End: 2022-12-26
Payer: COMMERCIAL

## 2022-12-31 ENCOUNTER — PATIENT MESSAGE (OUTPATIENT)
Dept: INTERNAL MEDICINE | Facility: CLINIC | Age: 72
End: 2022-12-31
Payer: COMMERCIAL

## 2022-12-31 DIAGNOSIS — M79.604 PAIN OF RIGHT LOWER EXTREMITY: ICD-10-CM

## 2023-01-02 ENCOUNTER — HOSPITAL ENCOUNTER (EMERGENCY)
Facility: HOSPITAL | Age: 73
Discharge: HOME | End: 2023-01-02
Attending: FAMILY MEDICINE
Payer: COMMERCIAL

## 2023-01-02 VITALS
HEIGHT: 63 IN | SYSTOLIC BLOOD PRESSURE: 132 MMHG | OXYGEN SATURATION: 99 % | RESPIRATION RATE: 16 BRPM | HEART RATE: 76 BPM | TEMPERATURE: 98 F | WEIGHT: 176.38 LBS | DIASTOLIC BLOOD PRESSURE: 75 MMHG | BODY MASS INDEX: 31.25 KG/M2

## 2023-01-02 DIAGNOSIS — I70.221 ATHEROSCLEROSIS OF NATIVE ARTERY OF RIGHT LOWER EXTREMITY WITH REST PAIN: Primary | ICD-10-CM

## 2023-01-02 LAB
ALBUMIN SERPL-MCNC: 3.7 G/DL (ref 3.4–4.8)
ALBUMIN/GLOB SERPL: 0.9 RATIO (ref 1.1–2)
ALP SERPL-CCNC: 95 UNIT/L (ref 40–150)
ALT SERPL-CCNC: 17 UNIT/L (ref 0–55)
AST SERPL-CCNC: 25 UNIT/L (ref 5–34)
BASOPHILS # BLD AUTO: 0.04 X10(3)/MCL (ref 0–0.2)
BASOPHILS NFR BLD AUTO: 0.5 %
BILIRUBIN DIRECT+TOT PNL SERPL-MCNC: 0.4 MG/DL
BUN SERPL-MCNC: 11.4 MG/DL (ref 8.4–25.7)
CALCIUM SERPL-MCNC: 10.1 MG/DL (ref 8.8–10)
CHLORIDE SERPL-SCNC: 102 MMOL/L (ref 98–107)
CO2 SERPL-SCNC: 26 MMOL/L (ref 23–31)
CREAT SERPL-MCNC: 1.2 MG/DL (ref 0.73–1.18)
EOSINOPHIL # BLD AUTO: 0.27 X10(3)/MCL (ref 0–0.9)
EOSINOPHIL NFR BLD AUTO: 3.7 %
ERYTHROCYTE [DISTWIDTH] IN BLOOD BY AUTOMATED COUNT: 14.6 % (ref 11.6–14.4)
GFR SERPLBLD CREATININE-BSD FMLA CKD-EPI: 65 MLS/MIN/1.73/M2
GLOBULIN SER-MCNC: 3.9 GM/DL (ref 2.4–3.5)
GLUCOSE SERPL-MCNC: 89 MG/DL (ref 82–115)
HCT VFR BLD AUTO: 40.2 % (ref 42–52)
HGB BLD-MCNC: 12.9 GM/DL (ref 14–18)
IMM GRANULOCYTES # BLD AUTO: 0.06 X10(3)/MCL (ref 0–0.04)
IMM GRANULOCYTES NFR BLD AUTO: 0.8 %
LACTATE SERPL-SCNC: 1.6 MMOL/L (ref 0.5–2.2)
LYMPHOCYTES # BLD AUTO: 2.61 X10(3)/MCL (ref 0.6–4.6)
LYMPHOCYTES NFR BLD AUTO: 35.4 %
MCH RBC QN AUTO: 31.3 PG
MCHC RBC AUTO-ENTMCNC: 32.1 MG/DL (ref 33–36)
MCV RBC AUTO: 97.6 FL (ref 80–94)
MONOCYTES # BLD AUTO: 1.07 X10(3)/MCL (ref 0.1–1.3)
MONOCYTES NFR BLD AUTO: 14.5 %
NEUTROPHILS # BLD AUTO: 3.33 X10(3)/MCL (ref 2.1–9.2)
NEUTROPHILS NFR BLD AUTO: 45.1 %
NRBC BLD AUTO-RTO: 0 % (ref 0–1)
PLATELET # BLD AUTO: 200 X10(3)/MCL (ref 140–371)
PMV BLD AUTO: 11.1 FL (ref 9.4–12.4)
POTASSIUM SERPL-SCNC: 4.3 MMOL/L (ref 3.5–5.1)
PROT SERPL-MCNC: 7.6 GM/DL (ref 5.8–7.6)
RBC # BLD AUTO: 4.12 X10(6)/MCL (ref 4.7–6.1)
SODIUM SERPL-SCNC: 138 MMOL/L (ref 136–145)
WBC # SPEC AUTO: 7.4 X10(3)/MCL (ref 4.5–11.5)

## 2023-01-02 PROCEDURE — 25000003 PHARM REV CODE 250: Performed by: FAMILY MEDICINE

## 2023-01-02 PROCEDURE — 83605 ASSAY OF LACTIC ACID: CPT | Performed by: FAMILY MEDICINE

## 2023-01-02 PROCEDURE — 99283 EMERGENCY DEPT VISIT LOW MDM: CPT

## 2023-01-02 PROCEDURE — 80053 COMPREHEN METABOLIC PANEL: CPT | Performed by: FAMILY MEDICINE

## 2023-01-02 PROCEDURE — 85025 COMPLETE CBC W/AUTO DIFF WBC: CPT | Performed by: FAMILY MEDICINE

## 2023-01-02 RX ORDER — OXYCODONE AND ACETAMINOPHEN 5; 325 MG/1; MG/1
1 TABLET ORAL
Status: COMPLETED | OUTPATIENT
Start: 2023-01-02 | End: 2023-01-02

## 2023-01-02 RX ORDER — OXYCODONE AND ACETAMINOPHEN 5; 325 MG/1; MG/1
1 TABLET ORAL EVERY 6 HOURS PRN
Qty: 20 TABLET | Refills: 0 | Status: SHIPPED | OUTPATIENT
Start: 2023-01-02 | End: 2023-01-07

## 2023-01-02 RX ADMIN — OXYCODONE HYDROCHLORIDE AND ACETAMINOPHEN 1 TABLET: 5; 325 TABLET ORAL at 01:01

## 2023-01-02 NOTE — ED PROVIDER NOTES
Encounter Date: 1/2/2023       History     Chief Complaint   Patient presents with    Leg Swelling     C/O bilateral leg swelling and pain x2 wks. Reports numbness and tingling as well as pain. Waiting to have surgery on leg for blockage     72-year-old gentleman presents emergency room complaints of bilateral lower extremity pain, but worse in the right lower extremity.  History of peripheral vascular disease.  Currently awaiting on a surgical repair.  Patient reports using tramadol every 4-6 hours, but having increased pain since this morning upon awakening.  Denies fever chills.  Denies nausea or vomiting.  Feels a normal state of health otherwise.    The history is provided by the patient.   Review of patient's allergies indicates:  No Known Allergies  Past Medical History:   Diagnosis Date    Hypertension     Mixed hyperlipidemia      Past Surgical History:   Procedure Laterality Date    BACK SURGERY  1996    CATARACT EXTRACTION      LEFT FOOT SURGERY  2016    LEFT LENS SURGERY       Family History   Problem Relation Age of Onset    Asthma Mother     Heart attack Father     Heart attack Brother     Heart attack Brother     Stomach cancer Brother     Heart attack Brother      Social History     Tobacco Use    Smoking status: Some Days     Types: Cigarettes    Smokeless tobacco: Never    Tobacco comments:      ~ 4 cigarettes daily   Substance Use Topics    Alcohol use: Not Currently    Drug use: Not Currently     Review of Systems   Constitutional:  Negative for chills, fatigue and fever.   HENT:  Negative for ear pain, rhinorrhea and sore throat.    Eyes:  Negative for photophobia and pain.   Respiratory:  Negative for cough, shortness of breath and wheezing.    Cardiovascular:  Negative for chest pain.   Gastrointestinal:  Negative for abdominal pain, diarrhea, nausea and vomiting.   Genitourinary:  Negative for dysuria.   Neurological:  Negative for dizziness, weakness and headaches.   All other systems  reviewed and are negative.    Physical Exam     Initial Vitals [01/02/23 1236]   BP Pulse Resp Temp SpO2   132/75 76 18 97.9 °F (36.6 °C) 99 %      MAP       --         Physical Exam    Nursing note and vitals reviewed.  Constitutional: He appears well-developed and well-nourished.   HENT:   Head: Normocephalic and atraumatic.   Eyes: EOM are normal. Pupils are equal, round, and reactive to light.   Neck: Neck supple.   Normal range of motion.  Cardiovascular:  Normal rate, regular rhythm and normal heart sounds.     Exam reveals no gallop and no friction rub.       No murmur heard.  Pulmonary/Chest: Breath sounds normal. No respiratory distress.   Abdominal: Abdomen is soft. Bowel sounds are normal. He exhibits no distension. There is no abdominal tenderness.   Musculoskeletal:         General: Edema present. Normal range of motion.      Cervical back: Normal range of motion and neck supple.      Comments: 2+ bilateral lower extremity edema.  Hyperemia of the toes in the right lower extremity.  No darkening or ulcerations present.  Cap refills in digits of right foot approximately 3 seconds.     Neurological: He is alert and oriented to person, place, and time. He has normal strength.   Skin: Skin is warm and dry. Capillary refill takes less than 2 seconds.   Psychiatric: He has a normal mood and affect. His behavior is normal. Judgment and thought content normal.       ED Course   Procedures  Labs Reviewed   COMPREHENSIVE METABOLIC PANEL - Abnormal; Notable for the following components:       Result Value    Creatinine 1.20 (*)     Calcium Level Total 10.1 (*)     Globulin 3.9 (*)     Albumin/Globulin Ratio 0.9 (*)     All other components within normal limits   CBC WITH DIFFERENTIAL - Abnormal; Notable for the following components:    RBC 4.12 (*)     Hgb 12.9 (*)     Hct 40.2 (*)     MCV 97.6 (*)     MCHC 32.1 (*)     RDW 14.6 (*)     IG# 0.06 (*)     All other components within normal limits   LACTIC ACID,  PLASMA - Normal   CBC W/ AUTO DIFFERENTIAL    Narrative:     The following orders were created for panel order CBC Auto Differential.  Procedure                               Abnormality         Status                     ---------                               -----------         ------                     CBC with Differential[[id number]]        Abnormal            Final result                 Please view results for these tests on the individual orders.          Imaging Results    None          Medications   oxyCODONE-acetaminophen 5-325 mg per tablet 1 tablet (1 tablet Oral Given 1/2/23 1332)     Medical Decision Making:   Initial Assessment:   Patient currently in no acute distress.  History of peripheral vascular disease with hyperemia the digits of the right foot.  Likely ischemic in nature.  Patient currently nontoxic appearing.  Appears his tramadol that he is using has not worked.  Patient does not appear to have an acutely ischemic limb, as his right lower extremity is warm all the way down to the foot with some slight delays in cap refill.  Will check a lactic acid as well as obtain regular laboratory evaluation and provide pain medication.           ED Course as of 01/02/23 1433   Mon Jan 02, 2023   1430 Feeling improved.  Stable for discharge to home.  ER precautions for any acute worsening. [MW]      ED Course User Index  [MW] Barry Ortega MD                 Clinical Impression:   Final diagnoses:  [I70.221] Atherosclerosis of native artery of right lower extremity with rest pain (Primary)        ED Disposition Condition    Discharge Stable          ED Prescriptions       Medication Sig Dispense Start Date End Date Auth. Provider    oxyCODONE-acetaminophen (PERCOCET) 5-325 mg per tablet Take 1 tablet by mouth every 6 (six) hours as needed for Pain. 20 tablet 1/2/2023 1/7/2023 Barry Ortega MD          Follow-up Information       Follow up With Specialties Details Why Contact Info     [facility] Emergency Medicine  As needed, If symptoms worsen [address] [facility] [id number] [phone]    Primary Care Physician  In 5 days               Barry Ortega MD  01/02/23 6619

## 2023-01-04 RX ORDER — INDOMETHACIN 25 MG/1
25 CAPSULE ORAL
Qty: 30 CAPSULE | Refills: 0 | OUTPATIENT
Start: 2023-01-04

## 2023-01-05 ENCOUNTER — PATIENT MESSAGE (OUTPATIENT)
Dept: INTERNAL MEDICINE | Facility: CLINIC | Age: 73
End: 2023-01-05
Payer: COMMERCIAL

## 2023-01-30 PROBLEM — Z00.00 WELLNESS EXAMINATION: Status: RESOLVED | Noted: 2022-10-27 | Resolved: 2023-01-30

## 2023-02-08 ENCOUNTER — PATIENT MESSAGE (OUTPATIENT)
Dept: RESEARCH | Facility: HOSPITAL | Age: 73
End: 2023-02-08
Payer: COMMERCIAL

## 2023-03-28 ENCOUNTER — TELEPHONE (OUTPATIENT)
Dept: ADMINISTRATIVE | Facility: HOSPITAL | Age: 73
End: 2023-03-28
Payer: COMMERCIAL

## 2023-03-28 DIAGNOSIS — Z12.5 SCREENING FOR PROSTATE CANCER: Primary | ICD-10-CM

## 2023-03-28 NOTE — TELEPHONE ENCOUNTER
----- Message from REGIS Peña sent at 10/27/2022  1:43 PM CDT -----  Regarding: Lab orders  Order yearly labs for next month's appt

## 2024-02-11 ENCOUNTER — HOSPITAL ENCOUNTER (EMERGENCY)
Facility: HOSPITAL | Age: 74
Discharge: HOME | End: 2024-02-11
Attending: INTERNAL MEDICINE
Payer: MEDICARE

## 2024-02-11 VITALS
WEIGHT: 187.81 LBS | DIASTOLIC BLOOD PRESSURE: 80 MMHG | HEIGHT: 68 IN | BODY MASS INDEX: 28.46 KG/M2 | RESPIRATION RATE: 16 BRPM | OXYGEN SATURATION: 100 % | TEMPERATURE: 98 F | HEART RATE: 78 BPM | SYSTOLIC BLOOD PRESSURE: 118 MMHG

## 2024-02-11 DIAGNOSIS — R07.9 CHEST PAIN: ICD-10-CM

## 2024-02-11 DIAGNOSIS — R07.89 ATYPICAL CHEST PAIN: Primary | ICD-10-CM

## 2024-02-11 LAB
ALBUMIN SERPL-MCNC: 3.9 G/DL (ref 3.4–4.8)
ALBUMIN/GLOB SERPL: 1 RATIO (ref 1.1–2)
ALP SERPL-CCNC: 101 UNIT/L (ref 40–150)
ALT SERPL-CCNC: 11 UNIT/L (ref 0–55)
AST SERPL-CCNC: 16 UNIT/L (ref 5–34)
BASOPHILS # BLD AUTO: 0.04 X10(3)/MCL
BASOPHILS NFR BLD AUTO: 0.6 %
BILIRUB SERPL-MCNC: 0.5 MG/DL
BNP BLD-MCNC: 28.7 PG/ML
BUN SERPL-MCNC: 16.6 MG/DL (ref 8.4–25.7)
CALCIUM SERPL-MCNC: 9.7 MG/DL (ref 8.8–10)
CHLORIDE SERPL-SCNC: 103 MMOL/L (ref 98–107)
CK MB SERPL-MCNC: 1.1 NG/ML
CK SERPL-CCNC: 141 U/L (ref 30–200)
CO2 SERPL-SCNC: 22 MMOL/L (ref 23–31)
CREAT SERPL-MCNC: 1.54 MG/DL (ref 0.73–1.18)
EOSINOPHIL # BLD AUTO: 0.14 X10(3)/MCL (ref 0–0.9)
EOSINOPHIL NFR BLD AUTO: 2 %
ERYTHROCYTE [DISTWIDTH] IN BLOOD BY AUTOMATED COUNT: 14.6 % (ref 11.5–17)
GFR SERPLBLD CREATININE-BSD FMLA CKD-EPI: 48 MLS/MIN/1.73/M2
GLOBULIN SER-MCNC: 3.8 GM/DL (ref 2.4–3.5)
GLUCOSE SERPL-MCNC: 103 MG/DL (ref 82–115)
HCT VFR BLD AUTO: 36.6 % (ref 42–52)
HGB BLD-MCNC: 12.6 G/DL (ref 14–18)
HOLD SPECIMEN: NORMAL
HOLD SPECIMEN: NORMAL
IMM GRANULOCYTES # BLD AUTO: 0.04 X10(3)/MCL (ref 0–0.04)
IMM GRANULOCYTES NFR BLD AUTO: 0.6 %
LYMPHOCYTES # BLD AUTO: 2.15 X10(3)/MCL (ref 0.6–4.6)
LYMPHOCYTES NFR BLD AUTO: 30 %
MCH RBC QN AUTO: 31.6 PG (ref 27–31)
MCHC RBC AUTO-ENTMCNC: 34.4 G/DL (ref 33–36)
MCV RBC AUTO: 91.7 FL (ref 80–94)
MONOCYTES # BLD AUTO: 0.7 X10(3)/MCL (ref 0.1–1.3)
MONOCYTES NFR BLD AUTO: 9.8 %
NEUTROPHILS # BLD AUTO: 4.09 X10(3)/MCL (ref 2.1–9.2)
NEUTROPHILS NFR BLD AUTO: 57 %
NRBC BLD AUTO-RTO: 0 %
PLATELET # BLD AUTO: 235 X10(3)/MCL (ref 130–400)
PMV BLD AUTO: 10.4 FL (ref 7.4–10.4)
POTASSIUM SERPL-SCNC: 3.6 MMOL/L (ref 3.5–5.1)
PROT SERPL-MCNC: 7.7 GM/DL (ref 5.8–7.6)
RBC # BLD AUTO: 3.99 X10(6)/MCL (ref 4.7–6.1)
SODIUM SERPL-SCNC: 136 MMOL/L (ref 136–145)
TROPONIN I SERPL-MCNC: <0.01 NG/ML (ref 0–0.04)
TROPONIN I SERPL-MCNC: <0.01 NG/ML (ref 0–0.04)
WBC # SPEC AUTO: 7.16 X10(3)/MCL (ref 4.5–11.5)

## 2024-02-11 PROCEDURE — 99285 EMERGENCY DEPT VISIT HI MDM: CPT | Mod: 25

## 2024-02-11 PROCEDURE — 83880 ASSAY OF NATRIURETIC PEPTIDE: CPT | Performed by: INTERNAL MEDICINE

## 2024-02-11 PROCEDURE — 85025 COMPLETE CBC W/AUTO DIFF WBC: CPT | Performed by: INTERNAL MEDICINE

## 2024-02-11 PROCEDURE — 84484 ASSAY OF TROPONIN QUANT: CPT | Performed by: INTERNAL MEDICINE

## 2024-02-11 PROCEDURE — 82553 CREATINE MB FRACTION: CPT | Performed by: INTERNAL MEDICINE

## 2024-02-11 PROCEDURE — 93005 ELECTROCARDIOGRAM TRACING: CPT

## 2024-02-11 PROCEDURE — 82550 ASSAY OF CK (CPK): CPT | Performed by: INTERNAL MEDICINE

## 2024-02-11 PROCEDURE — 80053 COMPREHEN METABOLIC PANEL: CPT | Performed by: INTERNAL MEDICINE

## 2024-02-11 NOTE — ED PROVIDER NOTES
Encounter Date: 2/11/2024       History     Chief Complaint   Patient presents with    Chest Pain     Intermittent midsternal nonradiating CP since 0700 this morning, reports SOB with the pain, EKG obtained     Presents with chest pain that started today AM after he shower. Pain was achy, moderate, anterior chest, non radiatng. Hx of HTN, PVD, Hyperlipidemia. Denies hemoptysis, shortness of breath, swelling or vomiting.    The history is provided by the patient.     Review of patient's allergies indicates:  No Known Allergies  Past Medical History:   Diagnosis Date    Hypertension     Mixed hyperlipidemia      Past Surgical History:   Procedure Laterality Date    BACK SURGERY  1996    CATARACT EXTRACTION      LEFT FOOT SURGERY  2016    LEFT LENS SURGERY       Family History   Problem Relation Age of Onset    Asthma Mother     Heart attack Father     Heart attack Brother     Heart attack Brother     Stomach cancer Brother     Heart attack Brother      Social History     Tobacco Use    Smoking status: Some Days     Types: Cigarettes    Smokeless tobacco: Never    Tobacco comments:      ~ 4 cigarettes daily   Substance Use Topics    Alcohol use: Not Currently    Drug use: Not Currently     Review of Systems   Constitutional:  Negative for fever.   HENT:  Negative for sore throat.    Respiratory:  Negative for shortness of breath.    Cardiovascular:  Positive for chest pain.   Gastrointestinal:  Negative for nausea.   Genitourinary:  Negative for dysuria.   Musculoskeletal:  Negative for back pain.   Skin:  Negative for rash.   Neurological:  Negative for weakness.   Hematological:  Does not bruise/bleed easily.   All other systems reviewed and are negative.      Physical Exam     Initial Vitals [02/11/24 0813]   BP Pulse Resp Temp SpO2   121/71 79 18 97 °F (36.1 °C) 98 %      MAP       --         Physical Exam    Nursing note and vitals reviewed.  Constitutional: He appears well-developed and well-nourished. No  distress.   HENT:   Head: Normocephalic and atraumatic.   Eyes: Conjunctivae and EOM are normal. Pupils are equal, round, and reactive to light.   Neck: Neck supple. No thyromegaly present. No tracheal deviation present. No JVD present.   Normal range of motion.  Cardiovascular:  Normal rate, regular rhythm and intact distal pulses.           Murmur (Grade 1) heard.  Pulmonary/Chest: Breath sounds normal. No respiratory distress.   Abdominal: Abdomen is soft. Bowel sounds are normal. He exhibits no distension. There is no abdominal tenderness. There is no rebound and no guarding.   Musculoskeletal:         General: No edema. Normal range of motion.      Cervical back: Normal range of motion and neck supple.     Neurological: He is alert and oriented to person, place, and time. He has normal strength. GCS score is 15. GCS eye subscore is 4. GCS verbal subscore is 5. GCS motor subscore is 6.   Skin: Skin is warm and dry. No rash noted.   Psychiatric: His behavior is normal.         ED Course   Procedures  Labs Reviewed   COMPREHENSIVE METABOLIC PANEL - Abnormal; Notable for the following components:       Result Value    Carbon Dioxide 22 (*)     Creatinine 1.54 (*)     Protein Total 7.7 (*)     Globulin 3.8 (*)     Albumin/Globulin Ratio 1.0 (*)     All other components within normal limits   CBC WITH DIFFERENTIAL - Abnormal; Notable for the following components:    RBC 3.99 (*)     Hgb 12.6 (*)     Hct 36.6 (*)     MCH 31.6 (*)     All other components within normal limits   TROPONIN I - Normal   B-TYPE NATRIURETIC PEPTIDE - Normal   CK-MB - Normal   CK - Normal   TROPONIN I - Normal   CBC W/ AUTO DIFFERENTIAL    Narrative:     The following orders were created for panel order CBC auto differential.  Procedure                               Abnormality         Status                     ---------                               -----------         ------                     CBC with Differential[[id number]]        Abnormal            Final result                 Please view results for these tests on the individual orders.   EXTRA TUBES    Narrative:     The following orders were created for panel order EXTRA TUBES.  Procedure                               Abnormality         Status                     ---------                               -----------         ------                     Light Blue Top Hold[[id number]]                             In process                 Gold Top Hold[[id number]]                                   In process                   Please view results for these tests on the individual orders.   LIGHT BLUE TOP HOLD   GOLD TOP HOLD     EKG Readings: (Independently Interpreted)   Initial Reading: No STEMI. Rhythm: Normal Sinus Rhythm. Heart Rate: 92. Ectopy: Frequent PACs. Conduction: Normal. ST Segments: Normal ST Segments. T Waves: Normal. Axis: Normal. Clinical Impression: Normal Sinus Rhythm with PACs       Imaging Results              X-Ray Chest PA And Lateral (Final result)  Result time 02/11/24 09:23:50      Final result by Nolan Velasquez MD (02/11/24 09:23:50)                   Impression:      No acute cardiopulmonary process identified.      Electronically signed by: Nolan Velasquez  Date:    02/11/2024  Time:    09:23               Narrative:    EXAMINATION:  XR CHEST PA AND LATERAL    CLINICAL HISTORY:  Chest Pain;    TECHNIQUE:  Two-view    COMPARISON:  November 26, 2020.    FINDINGS:  Cardiopericardial silhouette is within normal limits.  No acute dense focal or segmental consolidation, congestion, pleural effusion or pneumothorax.                                       Medications - No data to display  Medical Decision Making  Amount and/or Complexity of Data Reviewed  Labs: ordered. Decision-making details documented in ED Course.  Radiology: ordered and independent interpretation performed. Decision-making details documented in ED Course.  ECG/medicine tests: ordered and independent  interpretation performed. Decision-making details documented in ED Course.      Additional MDM:   Differential Diagnosis:   Miocardial infarction, pneumothorax, aortic dissection, pulmonary emboli, pneumonia, among others                                     Clinical Impression:  Final diagnoses:  [R07.9] Chest pain  [R07.89] Atypical chest pain (Primary)          ED Disposition Condition    Discharge Stable          ED Prescriptions    None       Follow-up Information       Follow up With Specialties Details Why Contact Info    Renee Villela FNP Family Medicine Schedule an appointment as soon as possible for a visit in 2 weeks  [address] [address]  [phone]      [facility] Emergency Medicine  If symptoms worsen [address] [facility] [id number] [phone]             Yusef Espinal MD  02/11/24 1132

## 2024-02-14 LAB
OHS QRS DURATION: 108 MS
OHS QTC CALCULATION: 457 MS

## 2024-06-26 ENCOUNTER — HOSPITAL ENCOUNTER (EMERGENCY)
Facility: HOSPITAL | Age: 74
Discharge: HOME | End: 2024-06-26
Attending: EMERGENCY MEDICINE
Payer: MEDICARE

## 2024-06-26 VITALS
SYSTOLIC BLOOD PRESSURE: 127 MMHG | WEIGHT: 185 LBS | HEIGHT: 68 IN | RESPIRATION RATE: 16 BRPM | HEART RATE: 76 BPM | DIASTOLIC BLOOD PRESSURE: 78 MMHG | TEMPERATURE: 99 F | BODY MASS INDEX: 28.04 KG/M2 | OXYGEN SATURATION: 98 %

## 2024-06-26 DIAGNOSIS — N18.32 STAGE 3B CHRONIC KIDNEY DISEASE: ICD-10-CM

## 2024-06-26 DIAGNOSIS — E86.0 DEHYDRATION: Primary | ICD-10-CM

## 2024-06-26 DIAGNOSIS — R53.1 WEAKNESS: ICD-10-CM

## 2024-06-26 LAB
ALBUMIN SERPL-MCNC: 3.4 G/DL (ref 3.4–4.8)
ALBUMIN/GLOB SERPL: 1.2 RATIO (ref 1.1–2)
ALP SERPL-CCNC: 81 UNIT/L (ref 40–150)
ALT SERPL-CCNC: 8 UNIT/L (ref 0–55)
ANION GAP SERPL CALC-SCNC: 9 MEQ/L
AST SERPL-CCNC: 17 UNIT/L (ref 5–34)
BACTERIA #/AREA URNS AUTO: ABNORMAL /HPF
BASOPHILS # BLD AUTO: 0.04 X10(3)/MCL
BASOPHILS NFR BLD AUTO: 0.6 %
BILIRUB SERPL-MCNC: 0.6 MG/DL
BILIRUB UR QL STRIP.AUTO: NEGATIVE
BUN SERPL-MCNC: 12.4 MG/DL (ref 8.4–25.7)
CALCIUM SERPL-MCNC: 8.6 MG/DL (ref 8.8–10)
CHLORIDE SERPL-SCNC: 104 MMOL/L (ref 98–107)
CLARITY UR: CLEAR
CO2 SERPL-SCNC: 21 MMOL/L (ref 23–31)
COLOR UR AUTO: ABNORMAL
CREAT SERPL-MCNC: 1.79 MG/DL (ref 0.73–1.18)
CREAT/UREA NIT SERPL: 7
EOSINOPHIL # BLD AUTO: 0.2 X10(3)/MCL (ref 0–0.9)
EOSINOPHIL NFR BLD AUTO: 2.8 %
ERYTHROCYTE [DISTWIDTH] IN BLOOD BY AUTOMATED COUNT: 15.2 % (ref 11.5–17)
GFR SERPLBLD CREATININE-BSD FMLA CKD-EPI: 40 ML/MIN/1.73/M2
GLOBULIN SER-MCNC: 2.8 GM/DL (ref 2.4–3.5)
GLUCOSE SERPL-MCNC: 115 MG/DL (ref 82–115)
GLUCOSE UR QL STRIP: NORMAL
HCT VFR BLD AUTO: 31.5 % (ref 42–52)
HGB BLD-MCNC: 10.5 G/DL (ref 14–18)
HGB UR QL STRIP: NEGATIVE
HYALINE CASTS #/AREA URNS LPF: ABNORMAL /LPF
IMM GRANULOCYTES # BLD AUTO: 0.04 X10(3)/MCL (ref 0–0.04)
IMM GRANULOCYTES NFR BLD AUTO: 0.6 %
KETONES UR QL STRIP: NEGATIVE
LEUKOCYTE ESTERASE UR QL STRIP: NEGATIVE
LYMPHOCYTES # BLD AUTO: 2.93 X10(3)/MCL (ref 0.6–4.6)
LYMPHOCYTES NFR BLD AUTO: 41.1 %
MAGNESIUM SERPL-MCNC: 1.8 MG/DL (ref 1.6–2.6)
MCH RBC QN AUTO: 31.2 PG (ref 27–31)
MCHC RBC AUTO-ENTMCNC: 33.3 G/DL (ref 33–36)
MCV RBC AUTO: 93.5 FL (ref 80–94)
MONOCYTES # BLD AUTO: 0.63 X10(3)/MCL (ref 0.1–1.3)
MONOCYTES NFR BLD AUTO: 8.8 %
NEUTROPHILS # BLD AUTO: 3.29 X10(3)/MCL (ref 2.1–9.2)
NEUTROPHILS NFR BLD AUTO: 46.1 %
NITRITE UR QL STRIP: NEGATIVE
NRBC BLD AUTO-RTO: 0 %
OHS QRS DURATION: 110 MS
OHS QTC CALCULATION: 438 MS
PH UR STRIP: 6.5 [PH]
PLATELET # BLD AUTO: 195 X10(3)/MCL (ref 130–400)
PMV BLD AUTO: 10.8 FL (ref 7.4–10.4)
POTASSIUM SERPL-SCNC: 3.4 MMOL/L (ref 3.5–5.1)
PROT SERPL-MCNC: 6.2 GM/DL (ref 5.8–7.6)
PROT UR QL STRIP: NEGATIVE
RBC # BLD AUTO: 3.37 X10(6)/MCL (ref 4.7–6.1)
RBC #/AREA URNS AUTO: ABNORMAL /HPF
SODIUM SERPL-SCNC: 134 MMOL/L (ref 136–145)
SP GR UR STRIP.AUTO: 1.01 (ref 1–1.03)
SQUAMOUS #/AREA URNS LPF: ABNORMAL /HPF
TROPONIN I SERPL-MCNC: <0.01 NG/ML (ref 0–0.04)
TSH SERPL-ACNC: 1.72 UIU/ML (ref 0.35–4.94)
UROBILINOGEN UR STRIP-ACNC: NORMAL
WBC # BLD AUTO: 7.13 X10(3)/MCL (ref 4.5–11.5)
WBC #/AREA URNS AUTO: ABNORMAL /HPF

## 2024-06-26 PROCEDURE — 81001 URINALYSIS AUTO W/SCOPE: CPT | Performed by: EMERGENCY MEDICINE

## 2024-06-26 PROCEDURE — 93005 ELECTROCARDIOGRAM TRACING: CPT

## 2024-06-26 PROCEDURE — 93010 ELECTROCARDIOGRAM REPORT: CPT | Mod: ,,, | Performed by: INTERNAL MEDICINE

## 2024-06-26 PROCEDURE — 99284 EMERGENCY DEPT VISIT MOD MDM: CPT | Mod: 25

## 2024-06-26 PROCEDURE — 96360 HYDRATION IV INFUSION INIT: CPT

## 2024-06-26 PROCEDURE — 84443 ASSAY THYROID STIM HORMONE: CPT | Performed by: EMERGENCY MEDICINE

## 2024-06-26 PROCEDURE — 83735 ASSAY OF MAGNESIUM: CPT | Performed by: EMERGENCY MEDICINE

## 2024-06-26 PROCEDURE — 80053 COMPREHEN METABOLIC PANEL: CPT | Performed by: EMERGENCY MEDICINE

## 2024-06-26 PROCEDURE — 84484 ASSAY OF TROPONIN QUANT: CPT | Performed by: EMERGENCY MEDICINE

## 2024-06-26 PROCEDURE — 85025 COMPLETE CBC W/AUTO DIFF WBC: CPT | Performed by: EMERGENCY MEDICINE

## 2024-06-26 PROCEDURE — 63600175 PHARM REV CODE 636 W HCPCS: Performed by: EMERGENCY MEDICINE

## 2024-06-26 RX ADMIN — SODIUM CHLORIDE, POTASSIUM CHLORIDE, SODIUM LACTATE AND CALCIUM CHLORIDE 1000 ML: 600; 310; 30; 20 INJECTION, SOLUTION INTRAVENOUS at 02:06

## 2024-06-26 NOTE — DISCHARGE INSTRUCTIONS
Do not take your lisinopril until you talk to your doctor and recheck your kidney function  Drink more water, especially when working outside.  Keep an eye on your blood pressure at home. Check your blood pressure before taking your blood pressure medications  Call your doctor in the morning to follow up

## 2024-06-26 NOTE — ED PROVIDER NOTES
Encounter Date: 6/26/2024    SCRIBE #1 NOTE: I, Bria Bee, am scribing for, and in the presence of,  Татьяна Westfall MD. I have scribed the following portions of the note - Other sections scribed: HPI, ROS, PE.       History     Chief Complaint   Patient presents with    Hypotension     Family reports pt became unresponsive while sitting outside prior to arrival. Aroused to verbal stimulation on ems arrival. Initial BP 70/34. Received 500 ml NS in route with EMS. GCS 15 on arrival. Denies chest pain or sob.      74 year old male with a past medical history of HTN and HLD presents to the ED via EMS for evaluation of hypotension. Patient reports cutting the grass with a push mower when he started to feel dizzy. He felt like he was going to pass out, so he sat down. Patient's family was calling for him, and found him resting in a chair with his eyes closed. Upon EMS arrival to the home, patient's blood pressure was 62/30. He was given fluids en route, and his pressure improved to 96/60 upon arrival to the ED. Patient states he was drinking water throughout the day.       The history is provided by the patient and the EMS personnel. No  was used.     Review of patient's allergies indicates:  No Known Allergies  Past Medical History:   Diagnosis Date    Hypertension     Mixed hyperlipidemia      Past Surgical History:   Procedure Laterality Date    BACK SURGERY  1996    CATARACT EXTRACTION      LEFT FOOT SURGERY  2016    LEFT LENS SURGERY       Family History   Problem Relation Name Age of Onset    Asthma Mother      Heart attack Father      Heart attack Brother Carmine     Heart attack Brother Oliverio     Stomach cancer Brother Grey Huang     Heart attack Brother Mario      Social History     Tobacco Use    Smoking status: Some Days     Types: Cigarettes    Smokeless tobacco: Never    Tobacco comments:      ~ 4 cigarettes daily   Substance Use Topics    Alcohol use: Not Currently    Drug  use: Not Currently     Review of Systems   Constitutional:  Negative for chills, diaphoresis and fever.   HENT:  Negative for congestion, ear pain, sinus pain and sore throat.    Eyes:  Negative for pain, discharge and visual disturbance.   Respiratory:  Negative for cough, shortness of breath, wheezing and stridor.    Cardiovascular:  Negative for chest pain and palpitations.   Gastrointestinal:  Negative for abdominal pain, constipation, diarrhea, nausea, rectal pain and vomiting.   Genitourinary:  Negative for dysuria and hematuria.   Musculoskeletal:  Negative for back pain and myalgias.   Skin:  Negative for rash.   Neurological:  Positive for dizziness and light-headedness. Negative for syncope, numbness and headaches.   Hematological: Negative.    Psychiatric/Behavioral: Negative.     All other systems reviewed and are negative.      Physical Exam     Initial Vitals [06/26/24 1411]   BP Pulse Resp Temp SpO2   96/60 65 14 98.8 °F (37.1 °C) 100 %      MAP       --         Physical Exam    Nursing note and vitals reviewed.  Constitutional: He appears well-developed and well-nourished. He is not diaphoretic. No distress.   Generally weak   HENT:   Head: Normocephalic and atraumatic.   Nose: Nose normal.   Mouth/Throat: Oropharynx is clear and moist.   Eyes: Conjunctivae and EOM are normal. Pupils are equal, round, and reactive to light.   Neck: Trachea normal. Neck supple.   Normal range of motion.  Cardiovascular:  Normal rate, regular rhythm, normal heart sounds and intact distal pulses.     Exam reveals no gallop and no friction rub.       No murmur heard.  Pulmonary/Chest: Breath sounds normal. No respiratory distress. He has no wheezes. He has no rhonchi. He has no rales. He exhibits no tenderness.   Abdominal: Abdomen is soft. Bowel sounds are normal. He exhibits no distension and no mass. There is no abdominal tenderness. There is no rebound.   Musculoskeletal:         General: No tenderness or edema.  Normal range of motion.      Cervical back: Normal range of motion and neck supple.      Lumbar back: Normal. No tenderness. Normal range of motion.     Neurological: He is alert and oriented to person, place, and time. He has normal strength. No cranial nerve deficit or sensory deficit.   Skin: Skin is warm and dry. Capillary refill takes less than 2 seconds. No rash and no abscess noted. No erythema. No pallor.   Psychiatric: He has a normal mood and affect. His behavior is normal. Judgment and thought content normal.         ED Course   Procedures  Labs Reviewed   COMPREHENSIVE METABOLIC PANEL - Abnormal; Notable for the following components:       Result Value    Sodium 134 (*)     Potassium 3.4 (*)     CO2 21 (*)     Creatinine 1.79 (*)     Calcium 8.6 (*)     All other components within normal limits   URINALYSIS, REFLEX TO URINE CULTURE - Abnormal; Notable for the following components:    Hyaline Casts, UA 11-20 (*)     All other components within normal limits   CBC WITH DIFFERENTIAL - Abnormal; Notable for the following components:    RBC 3.37 (*)     Hgb 10.5 (*)     Hct 31.5 (*)     MCH 31.2 (*)     MPV 10.8 (*)     All other components within normal limits   TROPONIN I - Normal   MAGNESIUM - Normal   TSH - Normal   CBC W/ AUTO DIFFERENTIAL    Narrative:     The following orders were created for panel order CBC auto differential.  Procedure                               Abnormality         Status                     ---------                               -----------         ------                     CBC with Differential[[id number]]       Abnormal            Final result                 Please view results for these tests on the individual orders.          Imaging Results    None          Medications   lactated ringers bolus 1,000 mL (0 mLs Intravenous Stopped 6/26/24 1526)     Medical Decision Making  The differential diagnosis includes, but is not limited to, electrolyte derangement, dehydration,  renal failure, anemia, dysrhythmia   Cbc, cmp, trop, mag, ua, EKG ordered and reviewed  Dehydration otherwise labs unremarkable  All rsolved with ivf  I think arrhythmia is unlikely. EKG shows normal sinus rhythm with no interval abnormalities such as QT prolongation or WPW. There are no findings to suggest Brugada syndrome. Cardiac monitoring in the emergency department reveals no tachycardic or bradycardic dysrhythmia. Hypertrophic cardiomyopathy was considered, but there are no clear historical elements pointing towards this. EKG is not suggestive. The QRS voltage is not extremely large and there are no suggestive Q waves  Wants to go home, increase po intake    Problems Addressed:  Dehydration: acute illness or injury that poses a threat to life or bodily functions  Stage 3b chronic kidney disease: chronic illness or injury  Weakness: acute illness or injury that poses a threat to life or bodily functions    Amount and/or Complexity of Data Reviewed  Independent Historian: EMS     Details: Upon EMS arrival to the home, patient's blood pressure was 62/30. He was given fluids en route, and his pressure improved to 9/60 upon arrival to the ED.  External Data Reviewed: labs and notes.     Details: Outpt visits, ckd  Labs: ordered.  ECG/medicine tests: ordered and independent interpretation performed.    Risk  OTC drugs.  Prescription drug management.            Scribe Attestation:   Scribe #1: I performed the above scribed service and the documentation accurately describes the services I performed. I attest to the accuracy of the note.  Comments: Attending:   Physician Attestation Statement for Scribe #1: I, Татьяна Westfall MD, personally performed the services described in this documentation. All medical record entries made by the scribe were at my direction and in my presence.  I have reviewed the chart and agree that the record reflects my personal performance and is accurate and complete.        Attending  Attestation:           Physician Attestation for Scribe:  Physician Attestation Statement for Scribe #1: I, Татьяна Westfall MD, reviewed documentation, as scribed by Bria Bee in my presence, and it is both accurate and complete.             ED Course as of 06/26/24 1719 Wed Jun 26, 2024   1423 EKG performed at 2:04 p.m. rate of 65 normal sinus rhythm with LVH [BS]   1539 He is feeling much better after fluids [BS]   1648 Pt wants to go home [BS]   1716 Discussed results in detail, heis eager for dc, he will take more breaks, drink plenty of fluids and follow up with pcp. Wants to go home now [BS]      ED Course User Index  [BS] Татьяна Westfall MD                             Clinical Impression:  Final diagnoses:  [E86.0] Dehydration (Primary)  [R53.1] Weakness  [N18.32] Stage 3b chronic kidney disease          ED Disposition Condition    Discharge Stable          ED Prescriptions    None       Follow-up Information       Follow up With Specialties Details Why Contact Info    Olga Fontaine MD Family Medicine Schedule an appointment as soon as possible for a visit   [address]  [address]  [facility]  [facility] [address]  [phone]      [facility] Emergency Dept Emergency Medicine  As needed, If symptoms worsen [address] [facility] [id number] [phone]             Татьяна Westfall MD  06/26/24 1719

## 2025-02-24 DIAGNOSIS — G62.89 OTHER POLYNEUROPATHY: Primary | ICD-10-CM
